# Patient Record
Sex: MALE | Race: WHITE | NOT HISPANIC OR LATINO | Employment: OTHER | URBAN - METROPOLITAN AREA
[De-identification: names, ages, dates, MRNs, and addresses within clinical notes are randomized per-mention and may not be internally consistent; named-entity substitution may affect disease eponyms.]

---

## 2017-04-26 ENCOUNTER — ALLSCRIPTS OFFICE VISIT (OUTPATIENT)
Dept: OTHER | Facility: OTHER | Age: 72
End: 2017-04-26

## 2017-04-26 DIAGNOSIS — M54.2 CERVICALGIA: ICD-10-CM

## 2017-04-26 DIAGNOSIS — E04.1 NONTOXIC SINGLE THYROID NODULE: ICD-10-CM

## 2017-04-26 DIAGNOSIS — R68.84 JAW PAIN: ICD-10-CM

## 2017-04-26 DIAGNOSIS — I10 ESSENTIAL (PRIMARY) HYPERTENSION: ICD-10-CM

## 2017-04-27 ENCOUNTER — APPOINTMENT (OUTPATIENT)
Dept: LAB | Age: 72
End: 2017-04-27
Payer: MEDICARE

## 2017-04-27 ENCOUNTER — TRANSCRIBE ORDERS (OUTPATIENT)
Dept: ADMINISTRATIVE | Age: 72
End: 2017-04-27

## 2017-04-27 ENCOUNTER — GENERIC CONVERSION - ENCOUNTER (OUTPATIENT)
Dept: OTHER | Facility: OTHER | Age: 72
End: 2017-04-27

## 2017-04-27 DIAGNOSIS — I10 ESSENTIAL (PRIMARY) HYPERTENSION: ICD-10-CM

## 2017-04-27 LAB
BUN SERPL-MCNC: 27 MG/DL (ref 5–25)
CREAT SERPL-MCNC: 1.28 MG/DL (ref 0.6–1.3)
GFR SERPL CREATININE-BSD FRML MDRD: 55.4 ML/MIN/1.73SQ M

## 2017-04-27 PROCEDURE — 84520 ASSAY OF UREA NITROGEN: CPT

## 2017-04-27 PROCEDURE — 36415 COLL VENOUS BLD VENIPUNCTURE: CPT

## 2017-04-27 PROCEDURE — 82565 ASSAY OF CREATININE: CPT

## 2017-05-04 ENCOUNTER — HOSPITAL ENCOUNTER (OUTPATIENT)
Dept: RADIOLOGY | Age: 72
Discharge: HOME | End: 2017-05-04
Payer: MEDICARE

## 2017-05-04 DIAGNOSIS — R68.84 JAW PAIN: ICD-10-CM

## 2017-05-04 DIAGNOSIS — M54.2 CERVICALGIA: ICD-10-CM

## 2017-05-04 DIAGNOSIS — E04.1 NONTOXIC SINGLE THYROID NODULE: ICD-10-CM

## 2017-05-04 PROCEDURE — 76536 US EXAM OF HEAD AND NECK: CPT

## 2017-05-04 PROCEDURE — 70492 CT SFT TSUE NCK W/O & W/DYE: CPT

## 2017-05-04 RX ADMIN — IOHEXOL 100 ML: 350 INJECTION, SOLUTION INTRAVENOUS at 10:01

## 2017-05-05 ENCOUNTER — GENERIC CONVERSION - ENCOUNTER (OUTPATIENT)
Dept: OTHER | Facility: OTHER | Age: 72
End: 2017-05-05

## 2017-05-15 ENCOUNTER — TRANSCRIBE ORDERS (OUTPATIENT)
Dept: ADMINISTRATIVE | Age: 72
End: 2017-05-15

## 2017-05-15 ENCOUNTER — APPOINTMENT (OUTPATIENT)
Dept: LAB | Age: 72
End: 2017-05-15
Payer: MEDICARE

## 2017-05-15 ENCOUNTER — GENERIC CONVERSION - ENCOUNTER (OUTPATIENT)
Dept: OTHER | Facility: OTHER | Age: 72
End: 2017-05-15

## 2017-05-15 DIAGNOSIS — E04.1 NONTOXIC UNINODULAR GOITER: ICD-10-CM

## 2017-05-15 DIAGNOSIS — E04.1 NONTOXIC UNINODULAR GOITER: Primary | ICD-10-CM

## 2017-05-15 LAB
T4 SERPL-MCNC: 9.1 UG/DL (ref 4.7–13.3)
TSH SERPL DL<=0.05 MIU/L-ACNC: 2.18 UIU/ML (ref 0.36–3.74)

## 2017-05-15 PROCEDURE — 84436 ASSAY OF TOTAL THYROXINE: CPT

## 2017-05-15 PROCEDURE — 84443 ASSAY THYROID STIM HORMONE: CPT

## 2017-05-15 PROCEDURE — 36415 COLL VENOUS BLD VENIPUNCTURE: CPT

## 2017-05-24 ENCOUNTER — HOSPITAL ENCOUNTER (OUTPATIENT)
Dept: RADIOLOGY | Facility: HOSPITAL | Age: 72
Discharge: HOME | End: 2017-05-24
Attending: SURGERY | Admitting: RADIOLOGY
Payer: MEDICARE

## 2017-05-24 DIAGNOSIS — E04.1 NONTOXIC UNINODULAR GOITER: ICD-10-CM

## 2017-05-24 PROCEDURE — 88173 CYTOPATH EVAL FNA REPORT: CPT | Performed by: SURGERY

## 2017-05-24 PROCEDURE — 76942 ECHO GUIDE FOR BIOPSY: CPT

## 2017-05-24 PROCEDURE — 10022 HB FNA W/IMAGE: CPT

## 2017-05-24 PROCEDURE — 88172 CYTP DX EVAL FNA 1ST EA SITE: CPT | Performed by: SURGERY

## 2017-05-24 RX ORDER — LIDOCAINE HYDROCHLORIDE 10 MG/ML
2.5 INJECTION, SOLUTION INFILTRATION; PERINEURAL ONCE
Status: COMPLETED | OUTPATIENT
Start: 2017-05-24 | End: 2017-05-24

## 2017-05-24 RX ADMIN — LIDOCAINE HYDROCHLORIDE 2.5 ML: 10 INJECTION, SOLUTION INFILTRATION; PERINEURAL at 10:50

## 2017-06-12 ENCOUNTER — APPOINTMENT (OUTPATIENT)
Dept: RADIOLOGY | Age: 72
End: 2017-06-12
Payer: MEDICARE

## 2017-06-12 ENCOUNTER — TRANSCRIBE ORDERS (OUTPATIENT)
Dept: ADMINISTRATIVE | Age: 72
End: 2017-06-12

## 2017-06-12 DIAGNOSIS — M25.562 PAIN IN LEFT KNEE: ICD-10-CM

## 2017-06-12 DIAGNOSIS — G89.29 CHRONIC PAIN OF LEFT KNEE: Primary | ICD-10-CM

## 2017-06-12 DIAGNOSIS — M25.562 CHRONIC PAIN OF LEFT KNEE: Primary | ICD-10-CM

## 2017-06-12 PROCEDURE — 73562 X-RAY EXAM OF KNEE 3: CPT

## 2017-06-15 ENCOUNTER — GENERIC CONVERSION - ENCOUNTER (OUTPATIENT)
Dept: OTHER | Facility: OTHER | Age: 72
End: 2017-06-15

## 2017-06-21 ENCOUNTER — GENERIC CONVERSION - ENCOUNTER (OUTPATIENT)
Dept: OTHER | Facility: OTHER | Age: 72
End: 2017-06-21

## 2017-06-21 ENCOUNTER — HOSPITAL ENCOUNTER (OUTPATIENT)
Dept: RADIOLOGY | Age: 72
Discharge: HOME | End: 2017-06-21
Payer: MEDICARE

## 2017-06-21 DIAGNOSIS — G89.29 CHRONIC PAIN OF LEFT KNEE: ICD-10-CM

## 2017-06-21 DIAGNOSIS — M25.562 CHRONIC PAIN OF LEFT KNEE: ICD-10-CM

## 2017-06-21 PROCEDURE — 73721 MRI JNT OF LWR EXTRE W/O DYE: CPT

## 2017-07-06 ENCOUNTER — ALLSCRIPTS OFFICE VISIT (OUTPATIENT)
Dept: OTHER | Facility: OTHER | Age: 72
End: 2017-07-06

## 2017-08-03 ENCOUNTER — ALLSCRIPTS OFFICE VISIT (OUTPATIENT)
Dept: OTHER | Facility: OTHER | Age: 72
End: 2017-08-03

## 2017-08-26 DIAGNOSIS — Z12.5 ENCOUNTER FOR SCREENING FOR MALIGNANT NEOPLASM OF PROSTATE: ICD-10-CM

## 2017-08-26 DIAGNOSIS — S83.242A OTHER TEAR OF MEDIAL MENISCUS, CURRENT INJURY, LEFT KNEE, INITIAL ENCOUNTER: ICD-10-CM

## 2017-08-26 DIAGNOSIS — E78.00 PURE HYPERCHOLESTEROLEMIA: ICD-10-CM

## 2017-08-26 DIAGNOSIS — Z00.00 ENCOUNTER FOR GENERAL ADULT MEDICAL EXAMINATION WITHOUT ABNORMAL FINDINGS: ICD-10-CM

## 2017-09-07 ENCOUNTER — GENERIC CONVERSION - ENCOUNTER (OUTPATIENT)
Dept: OTHER | Facility: OTHER | Age: 72
End: 2017-09-07

## 2017-09-08 ENCOUNTER — GENERIC CONVERSION - ENCOUNTER (OUTPATIENT)
Dept: OTHER | Facility: OTHER | Age: 72
End: 2017-09-08

## 2017-09-08 ENCOUNTER — APPOINTMENT (OUTPATIENT)
Dept: LAB | Age: 72
End: 2017-09-08
Payer: MEDICARE

## 2017-09-08 ENCOUNTER — ALLSCRIPTS OFFICE VISIT (OUTPATIENT)
Dept: OTHER | Facility: OTHER | Age: 72
End: 2017-09-08

## 2017-09-08 ENCOUNTER — TRANSCRIBE ORDERS (OUTPATIENT)
Dept: ADMINISTRATIVE | Age: 72
End: 2017-09-08

## 2017-09-08 DIAGNOSIS — E78.00 PURE HYPERCHOLESTEROLEMIA: ICD-10-CM

## 2017-09-08 DIAGNOSIS — Z12.5 ENCOUNTER FOR SCREENING FOR MALIGNANT NEOPLASM OF PROSTATE: ICD-10-CM

## 2017-09-08 DIAGNOSIS — S83.242A OTHER TEAR OF MEDIAL MENISCUS, CURRENT INJURY, LEFT KNEE, INITIAL ENCOUNTER: ICD-10-CM

## 2017-09-08 DIAGNOSIS — Z00.00 ENCOUNTER FOR GENERAL ADULT MEDICAL EXAMINATION WITHOUT ABNORMAL FINDINGS: ICD-10-CM

## 2017-09-08 LAB
ALBUMIN SERPL BCP-MCNC: 4 G/DL (ref 3.5–5)
ALP SERPL-CCNC: 58 U/L (ref 46–116)
ALT SERPL W P-5'-P-CCNC: 31 U/L (ref 12–78)
ANION GAP SERPL CALCULATED.3IONS-SCNC: 6 MMOL/L (ref 4–13)
AST SERPL W P-5'-P-CCNC: 24 U/L (ref 5–45)
BASOPHILS # BLD AUTO: 0.05 THOUSANDS/ΜL (ref 0–0.1)
BASOPHILS NFR BLD AUTO: 1 % (ref 0–1)
BILIRUB SERPL-MCNC: 0.6 MG/DL (ref 0.2–1)
BUN SERPL-MCNC: 22 MG/DL (ref 5–25)
CALCIUM SERPL-MCNC: 8.8 MG/DL (ref 8.3–10.1)
CHLORIDE SERPL-SCNC: 101 MMOL/L (ref 100–108)
CHOLEST SERPL-MCNC: 236 MG/DL (ref 50–200)
CO2 SERPL-SCNC: 29 MMOL/L (ref 21–32)
CREAT SERPL-MCNC: 1.21 MG/DL (ref 0.6–1.3)
EOSINOPHIL # BLD AUTO: 0.08 THOUSAND/ΜL (ref 0–0.61)
EOSINOPHIL NFR BLD AUTO: 1 % (ref 0–6)
ERYTHROCYTE [DISTWIDTH] IN BLOOD BY AUTOMATED COUNT: 13.4 % (ref 11.6–15.1)
GFR SERPL CREATININE-BSD FRML MDRD: 60 ML/MIN/1.73SQ M
GLUCOSE P FAST SERPL-MCNC: 83 MG/DL (ref 65–99)
HCT VFR BLD AUTO: 39.4 % (ref 36.5–49.3)
HCV AB SER QL: NORMAL
HDLC SERPL-MCNC: 57 MG/DL (ref 40–60)
HGB BLD-MCNC: 13.6 G/DL (ref 12–17)
LDLC SERPL CALC-MCNC: 163 MG/DL (ref 0–100)
LYMPHOCYTES # BLD AUTO: 1.91 THOUSANDS/ΜL (ref 0.6–4.47)
LYMPHOCYTES NFR BLD AUTO: 30 % (ref 14–44)
MCH RBC QN AUTO: 31.6 PG (ref 26.8–34.3)
MCHC RBC AUTO-ENTMCNC: 34.5 G/DL (ref 31.4–37.4)
MCV RBC AUTO: 91 FL (ref 82–98)
MONOCYTES # BLD AUTO: 0.48 THOUSAND/ΜL (ref 0.17–1.22)
MONOCYTES NFR BLD AUTO: 8 % (ref 4–12)
NEUTROPHILS # BLD AUTO: 3.85 THOUSANDS/ΜL (ref 1.85–7.62)
NEUTS SEG NFR BLD AUTO: 60 % (ref 43–75)
NRBC BLD AUTO-RTO: 0 /100 WBCS
PLATELET # BLD AUTO: 304 THOUSANDS/UL (ref 149–390)
PMV BLD AUTO: 10.3 FL (ref 8.9–12.7)
POTASSIUM SERPL-SCNC: 4.2 MMOL/L (ref 3.5–5.3)
PROT SERPL-MCNC: 7.9 G/DL (ref 6.4–8.2)
PSA SERPL-MCNC: 3.2 NG/ML (ref 0–4)
RBC # BLD AUTO: 4.31 MILLION/UL (ref 3.88–5.62)
SODIUM SERPL-SCNC: 136 MMOL/L (ref 136–145)
TRIGL SERPL-MCNC: 78 MG/DL
TSH SERPL DL<=0.05 MIU/L-ACNC: 2.86 UIU/ML (ref 0.36–3.74)
WBC # BLD AUTO: 6.38 THOUSAND/UL (ref 4.31–10.16)

## 2017-09-08 PROCEDURE — 84443 ASSAY THYROID STIM HORMONE: CPT

## 2017-09-08 PROCEDURE — 86803 HEPATITIS C AB TEST: CPT

## 2017-09-08 PROCEDURE — 36415 COLL VENOUS BLD VENIPUNCTURE: CPT

## 2017-09-08 PROCEDURE — 80053 COMPREHEN METABOLIC PANEL: CPT

## 2017-09-08 PROCEDURE — 85025 COMPLETE CBC W/AUTO DIFF WBC: CPT

## 2017-09-08 PROCEDURE — 80061 LIPID PANEL: CPT

## 2017-09-08 PROCEDURE — G0103 PSA SCREENING: HCPCS

## 2017-09-14 RX ORDER — RAMIPRIL 2.5 MG/1
2.5 CAPSULE ORAL
COMMUNITY
End: 2018-04-16 | Stop reason: SDUPTHER

## 2017-09-18 ENCOUNTER — ANESTHESIA EVENT (OUTPATIENT)
Dept: PERIOP | Facility: HOSPITAL | Age: 72
End: 2017-09-18
Payer: MEDICARE

## 2017-09-19 ENCOUNTER — ANESTHESIA (OUTPATIENT)
Dept: PERIOP | Facility: HOSPITAL | Age: 72
End: 2017-09-19
Payer: MEDICARE

## 2017-09-19 ENCOUNTER — HOSPITAL ENCOUNTER (OUTPATIENT)
Facility: HOSPITAL | Age: 72
Setting detail: OUTPATIENT SURGERY
Discharge: HOME | End: 2017-09-19
Attending: ORTHOPAEDIC SURGERY | Admitting: ORTHOPAEDIC SURGERY
Payer: MEDICARE

## 2017-09-19 VITALS
WEIGHT: 209 LBS | SYSTOLIC BLOOD PRESSURE: 148 MMHG | TEMPERATURE: 96.3 F | HEIGHT: 73 IN | OXYGEN SATURATION: 95 % | RESPIRATION RATE: 16 BRPM | DIASTOLIC BLOOD PRESSURE: 74 MMHG | HEART RATE: 62 BPM | BODY MASS INDEX: 27.7 KG/M2

## 2017-09-19 PROBLEM — S83.242A OTHER TEAR OF MEDIAL MENISCUS, CURRENT INJURY, LEFT KNEE, INITIAL ENCOUNTER: Status: ACTIVE | Noted: 2017-09-19

## 2017-09-19 RX ORDER — FENTANYL CITRATE/PF 50 MCG/ML
25 SYRINGE (ML) INJECTION
Status: DISCONTINUED | OUTPATIENT
Start: 2017-09-19 | End: 2017-09-19 | Stop reason: HOSPADM

## 2017-09-19 RX ORDER — ONDANSETRON 2 MG/ML
4 INJECTION INTRAMUSCULAR; INTRAVENOUS EVERY 6 HOURS PRN
Status: DISCONTINUED | OUTPATIENT
Start: 2017-09-19 | End: 2017-09-19 | Stop reason: HOSPADM

## 2017-09-19 RX ORDER — METOCLOPRAMIDE HYDROCHLORIDE 5 MG/ML
10 INJECTION INTRAMUSCULAR; INTRAVENOUS ONCE AS NEEDED
Status: DISCONTINUED | OUTPATIENT
Start: 2017-09-19 | End: 2017-09-19 | Stop reason: HOSPADM

## 2017-09-19 RX ORDER — FENTANYL CITRATE 50 UG/ML
INJECTION, SOLUTION INTRAMUSCULAR; INTRAVENOUS AS NEEDED
Status: DISCONTINUED | OUTPATIENT
Start: 2017-09-19 | End: 2017-09-19 | Stop reason: SURG

## 2017-09-19 RX ORDER — BUPIVACAINE HYDROCHLORIDE 2.5 MG/ML
INJECTION, SOLUTION EPIDURAL; INFILTRATION; INTRACAUDAL AS NEEDED
Status: DISCONTINUED | OUTPATIENT
Start: 2017-09-19 | End: 2017-09-19 | Stop reason: HOSPADM

## 2017-09-19 RX ORDER — PROPOFOL 10 MG/ML
INJECTION, EMULSION INTRAVENOUS AS NEEDED
Status: DISCONTINUED | OUTPATIENT
Start: 2017-09-19 | End: 2017-09-19 | Stop reason: SURG

## 2017-09-19 RX ORDER — OXYCODONE HYDROCHLORIDE 5 MG/1
TABLET ORAL
Qty: 15 TABLET | Refills: 0 | Status: SHIPPED | OUTPATIENT
Start: 2017-09-19 | End: 2018-02-13 | Stop reason: HOSPADM

## 2017-09-19 RX ORDER — OXYCODONE HYDROCHLORIDE 5 MG/1
5 TABLET ORAL EVERY 4 HOURS PRN
Status: DISCONTINUED | OUTPATIENT
Start: 2017-09-19 | End: 2017-09-19 | Stop reason: HOSPADM

## 2017-09-19 RX ORDER — SODIUM CHLORIDE, SODIUM LACTATE, POTASSIUM CHLORIDE, CALCIUM CHLORIDE 600; 310; 30; 20 MG/100ML; MG/100ML; MG/100ML; MG/100ML
125 INJECTION, SOLUTION INTRAVENOUS CONTINUOUS
Status: DISCONTINUED | OUTPATIENT
Start: 2017-09-19 | End: 2017-09-19 | Stop reason: HOSPADM

## 2017-09-19 RX ORDER — ACETAMINOPHEN 325 MG/1
650 TABLET ORAL EVERY 6 HOURS PRN
Status: DISCONTINUED | OUTPATIENT
Start: 2017-09-19 | End: 2017-09-19 | Stop reason: HOSPADM

## 2017-09-19 RX ORDER — MIDAZOLAM HYDROCHLORIDE 1 MG/ML
INJECTION INTRAMUSCULAR; INTRAVENOUS AS NEEDED
Status: DISCONTINUED | OUTPATIENT
Start: 2017-09-19 | End: 2017-09-19 | Stop reason: SURG

## 2017-09-19 RX ORDER — ONDANSETRON 2 MG/ML
4 INJECTION INTRAMUSCULAR; INTRAVENOUS ONCE AS NEEDED
Status: DISCONTINUED | OUTPATIENT
Start: 2017-09-19 | End: 2017-09-19 | Stop reason: HOSPADM

## 2017-09-19 RX ORDER — LIDOCAINE HYDROCHLORIDE 10 MG/ML
INJECTION, SOLUTION INFILTRATION; PERINEURAL AS NEEDED
Status: DISCONTINUED | OUTPATIENT
Start: 2017-09-19 | End: 2017-09-19 | Stop reason: SURG

## 2017-09-19 RX ORDER — OXYCODONE HYDROCHLORIDE 10 MG/1
10 TABLET ORAL EVERY 4 HOURS PRN
Status: DISCONTINUED | OUTPATIENT
Start: 2017-09-19 | End: 2017-09-19 | Stop reason: HOSPADM

## 2017-09-19 RX ORDER — ONDANSETRON 2 MG/ML
INJECTION INTRAMUSCULAR; INTRAVENOUS AS NEEDED
Status: DISCONTINUED | OUTPATIENT
Start: 2017-09-19 | End: 2017-09-19 | Stop reason: SURG

## 2017-09-19 RX ORDER — DEXAMETHASONE SODIUM PHOSPHATE 4 MG/ML
INJECTION, SOLUTION INTRA-ARTICULAR; INTRALESIONAL; INTRAMUSCULAR; INTRAVENOUS; SOFT TISSUE AS NEEDED
Status: DISCONTINUED | OUTPATIENT
Start: 2017-09-19 | End: 2017-09-19 | Stop reason: SURG

## 2017-09-19 RX ORDER — KETOROLAC TROMETHAMINE 30 MG/ML
INJECTION, SOLUTION INTRAMUSCULAR; INTRAVENOUS AS NEEDED
Status: DISCONTINUED | OUTPATIENT
Start: 2017-09-19 | End: 2017-09-19 | Stop reason: SURG

## 2017-09-19 RX ADMIN — PROPOFOL 160 MG: 10 INJECTION, EMULSION INTRAVENOUS at 09:10

## 2017-09-19 RX ADMIN — FENTANYL CITRATE 25 MCG: 50 INJECTION INTRAMUSCULAR; INTRAVENOUS at 10:16

## 2017-09-19 RX ADMIN — FENTANYL CITRATE 25 MCG: 50 INJECTION INTRAMUSCULAR; INTRAVENOUS at 10:13

## 2017-09-19 RX ADMIN — SODIUM CHLORIDE, SODIUM LACTATE, POTASSIUM CHLORIDE, AND CALCIUM CHLORIDE 125 ML/HR: .6; .31; .03; .02 INJECTION, SOLUTION INTRAVENOUS at 07:21

## 2017-09-19 RX ADMIN — ONDANSETRON 4 MG: 2 INJECTION INTRAMUSCULAR; INTRAVENOUS at 09:18

## 2017-09-19 RX ADMIN — HYDROMORPHONE HYDROCHLORIDE 0.25 MG: 1 INJECTION, SOLUTION INTRAMUSCULAR; INTRAVENOUS; SUBCUTANEOUS at 10:49

## 2017-09-19 RX ADMIN — FENTANYL CITRATE 25 MCG: 50 INJECTION, SOLUTION INTRAMUSCULAR; INTRAVENOUS at 09:18

## 2017-09-19 RX ADMIN — DEXAMETHASONE SODIUM PHOSPHATE 4 MG: 4 INJECTION, SOLUTION INTRAMUSCULAR; INTRAVENOUS at 09:18

## 2017-09-19 RX ADMIN — MIDAZOLAM HYDROCHLORIDE 1 MG: 1 INJECTION, SOLUTION INTRAMUSCULAR; INTRAVENOUS at 09:04

## 2017-09-19 RX ADMIN — CEFAZOLIN SODIUM 2000 MG: 2 SOLUTION INTRAVENOUS at 09:04

## 2017-09-19 RX ADMIN — KETOROLAC TROMETHAMINE 15 MG: 30 INJECTION, SOLUTION INTRAMUSCULAR at 09:35

## 2017-09-19 RX ADMIN — LIDOCAINE HYDROCHLORIDE 50 MG: 10 INJECTION, SOLUTION INFILTRATION; PERINEURAL at 09:10

## 2017-09-19 RX ADMIN — FENTANYL CITRATE 25 MCG: 50 INJECTION, SOLUTION INTRAMUSCULAR; INTRAVENOUS at 09:28

## 2017-09-19 RX ADMIN — HYDROMORPHONE HYDROCHLORIDE 0.25 MG: 1 INJECTION, SOLUTION INTRAMUSCULAR; INTRAVENOUS; SUBCUTANEOUS at 10:32

## 2017-09-19 RX ADMIN — PROPOFOL 40 MG: 10 INJECTION, EMULSION INTRAVENOUS at 09:23

## 2017-09-19 RX ADMIN — FENTANYL CITRATE 25 MCG: 50 INJECTION, SOLUTION INTRAMUSCULAR; INTRAVENOUS at 09:21

## 2017-09-21 ENCOUNTER — ALLSCRIPTS OFFICE VISIT (OUTPATIENT)
Dept: OTHER | Facility: OTHER | Age: 72
End: 2017-09-21

## 2017-10-26 ENCOUNTER — ALLSCRIPTS OFFICE VISIT (OUTPATIENT)
Dept: OTHER | Facility: OTHER | Age: 72
End: 2017-10-26

## 2017-10-27 NOTE — PROGRESS NOTES
Assessment  1  Arthritis of knee, left (609 19) (M17 12)    Plan  Arthritis of knee, left    · Follow-up PRN Evaluation and Treatment  Follow-up  Status: Complete  Done:  83TWQ9491 11:33AM    Discussion/Summary    Discussed history, subjective symptoms and radiographic findings with patient  Likely he is experienceing some inflammation and swelling due to underlying osteoarthritis  He was given the option of having an arthrocentesis and injection done in the office today, which he accepted  30 ml of clear fluid was removed from his knee and a corticosteroid injection was then provided  He tolerated the procedure well with no complications  He can continue to increase his activity as tolerated and see us in the future if his symptoms do not improve or worsen  The patient was counseled regarding diagnostic results,-- instructions for management,-- risk factor reductions,-- prognosis,-- patient and family education,-- impressions,-- risks and benefits of treatment options,-- importance of compliance with treatment  Possible side effects of new medications were reviewed with the patient/guardian today  The treatment plan was reviewed with the patient/guardian  The patient/guardian understands and agrees with the treatment plan         Self Referrals: No      Chief Complaint  1  Knee Pain  Left knee follow up [facility] 9/19/17      History of Present Illness  Knee Problem: The patient is being seen for left a knee problem  Symptoms:  pain,-- swelling-- and-- decreased range of motion  The patient is currently experiencing symptoms  Exacerbating factors:  Stairs  Relieving factors:  rest  No associated symptoms are reported  HPI: 69 yo M post op arthroscopic partial left medial meniscectomy 9/19/17 comes for follow up and has concerns about some pain and swelling of his left knee  He has noticed that he is having some anterior knee pain when he goes up and down stairs   He feels there is also a restriction in his knee, like there may be some fluid in his knee that is preventing him from fully extending his knee  Knee Pain: Korin Brown presents with complaints of knee pain  Associated symptoms include swelling  Review of Systems    Constitutional: No fever or chills, feels well, no tiredness, no recent weight loss or weight gain  Eyes: No complaints of red eyes, no eyesight problems  ENT: no complaints of loss of hearing, no nosebleeds, no sore throat  Cardiovascular: No complaints of chest pain, no palpitations, no leg claudication or lower extremity edema  Respiratory: No complaints of shortness of breath, no wheezing, no cough  Gastrointestinal: No complaints of abdominal pain, no constipation, no nausea or vomiting, no diarrhea or bloody stools  Genitourinary: No complaints of dysuria or incontinence, no hesitancy, no nocturia  Musculoskeletal: as noted in HPI  Integumentary: No complaints of skin rash or lesion, no itching or dry skin, no skin wounds  Neurological: No complaints of headache, no confusion, no numbness or tingling, no dizziness  Psychiatric: No suicidal thoughts, no anxiety, no depression  Endocrine: No muscle weakness, no frequent urination, no excessive thirst, no feelings of weakness  ROS reviewed  Active Problems  1  Acute medial meniscal tear, left, subsequent encounter (V58 89,836 0) (S83 242D)   2  Acute pain of left knee (719 46) (M25 562)   3  Advance directive discussed with patient (V65 49) (Z71 89)   4  Back pain (724 5) (M54 9)   5  Depression screen (V79 0) (Z13 89)   6  Encounter for prostate cancer screening (V76 44) (Z12 5)   7  Essential hypertension (401 9) (I10)   8  Hypercholesteremia (272 0) (E78 00)   9  Jaw pain (784 92) (R68 84)   10  Neck pain (723 1) (M54 2)   11  Need for hepatitis A vaccination (V05 3) (Z23)   12  Need for influenza vaccination (V04 81) (Z23)   13   Need for vaccination with 13-polyvalent pneumococcal conjugate vaccine (V03 82) (Z23)   14  Parotiditis (527 2) (K11 20)   15  Screening for genitourinary condition (V81 6) (Z13 89)   16  Thyroid nodule (241 0) (E04 1)    Past Medical History    The active problems and past medical history were reviewed and updated today  Surgical History   · History of Knee Arthroscopy With Medial Meniscectomy    The surgical history was reviewed and updated today  Family History  Mother    · Family history of malignant neoplasm (V16 9) (Z80 9)    The family history was reviewed and updated today  Social History   · Former smoker (W64 72) (T19 507)   ·    · Retired   · Social alcohol use (Z78 9)  The social history was reviewed and updated today  The social history was reviewed and is unchanged  Current Meds   1  Ramipril 2 5 MG Oral Capsule; TAKE 1 CAPSULE EVERY DAY  Requested for:   26Apr2017; Last Rx:26Apr2017 Ordered    The medication list was reviewed and updated today  Allergies  1  No Known Drug Allergies    Vitals  Signs   Heart Rate: 56  Systolic: 540  Diastolic: 71  Height: 6 ft 0 75 in  Weight: 212 lb   BMI Calculated: 28 16  BSA Calculated: 2 2  Pain Scale: 5    Physical Exam    Left Knee: Appearance: an effusion grade 1, but-- no deformity  Tenderness: lateral joint line-- and-- diffuse anterior knee  ROM: Full  Motor: Normal  Special Test: negative patellar grind,-- negative patellofemoral apprehension test,-- negative lateral Apley's Grind test,-- negative medial Apley's Grind test,-- negative Bounce Home test,-- negative medial Rebeca test,-- negative lateral Rebeca test,-- negative Anterior Drawer sign-- and-- negative Lachman test    Constitutional - General appearance: Normal    Musculoskeletal - Gait and station: Normal -- Lower extremity compartments: Normal    Cardiovascular - Pulses: Normal    Abdomen - Abdomen - Soft, nontender, nondistended  Abdomen: The abdomen was rounded     Skin - Skin and subcutaneous tissue: Normal  Neurologic - Sensation: Normal -- Lower extremity peripheral neuro exam: Normal    Psychiatric - Orientation to person, place, and time: Normal -- Mood and affect: Normal    Eyes   Conjunctiva and lids: Normal     Pupils and irises: Normal        Results/Data  I personally reviewed the films/images/results in the office today  My interpretation follows  X-ray Review Previous radiographs reviewed  There is mild amount of medial compartment joint degenerative changes  No acute osseus abnormalities  Procedure    Procedure: Aspiration Injection of the left knee joint  Indication:  Effusion,-- Joint pain-- and-- Osteoarthritis  Potential complications include bleeding,-- infection-- and-- allergic reaction  Risk, benefits and alternatives were discussed with the patient  Verbal consent was obtained prior to the procedure  Alcohol was used to prep the area  ethyl chloride spray was used as a topical anesthetic  Using sterile technique, the aspiration/injection needle was then directed from a Superolateral aspect  30 mL of clear fluid was aspirated with an 18-gauge 1 5 inch needle  The syringe was changed and the same needle was left in place and was used to inject 4 mL of 0 25% Bupivacaine-- and-- 1 mL of 6mg/mL betamethasone  A bandage was applied  the patient tolerated the procedure well  Complications: none  Attending Note  Attending Note [facility]: Attending Note: I interviewed, took the history and examined the patient,-- the staff discussed the patient on the day of the visit,-- I supervised the Resident,-- I supervised the procedure performed by the Resident-- and-- I agree with the Resident management plan as it was presented to me  Level of Participation: I was present in clinic and examined the patient  Diagnosis and Plan: the patient has an effusion of his left knee secondary to osteoarthritis and an aspiration and injection was provided today   This is separate and distinct from his meniscal pathology, though symptoms have been improved by the surgery    If he continues to be symptomatic Visco supplementation could be considered and finally total knee arthroplasty would be the surgical treatment for osteoarthritis of the knee  I agree with the Resident's note  Signatures   Electronically signed by :  Molly Cordon DO; Oct 26 2017 10:17AM EST                       (Author)    Electronically signed by : SIXTO Mckee ; Oct 26 2017 11:34AM EST                       (Author)

## 2017-11-06 ENCOUNTER — TRANSCRIBE ORDERS (OUTPATIENT)
Dept: ADMINISTRATIVE | Facility: HOSPITAL | Age: 72
End: 2017-11-06

## 2017-11-06 ENCOUNTER — GENERIC CONVERSION - ENCOUNTER (OUTPATIENT)
Dept: OTHER | Facility: OTHER | Age: 72
End: 2017-11-06

## 2017-11-06 DIAGNOSIS — E04.1 NONTOXIC UNINODULAR GOITER: Primary | ICD-10-CM

## 2017-11-13 ENCOUNTER — TRANSCRIBE ORDERS (OUTPATIENT)
Dept: RADIOLOGY | Facility: HOSPITAL | Age: 72
End: 2017-11-13

## 2017-11-13 ENCOUNTER — HOSPITAL ENCOUNTER (OUTPATIENT)
Dept: RADIOLOGY | Facility: HOSPITAL | Age: 72
Discharge: HOME | End: 2017-11-13
Attending: SURGERY
Payer: MEDICARE

## 2017-11-13 DIAGNOSIS — E04.1 NONTOXIC UNINODULAR GOITER: ICD-10-CM

## 2017-11-13 PROCEDURE — 76942 ECHO GUIDE FOR BIOPSY: CPT

## 2017-11-13 PROCEDURE — 88173 CYTOPATH EVAL FNA REPORT: CPT | Performed by: SURGERY

## 2017-11-13 PROCEDURE — 88172 CYTP DX EVAL FNA 1ST EA SITE: CPT | Performed by: SURGERY

## 2017-11-13 PROCEDURE — 10022 HB FNA W/IMAGE: CPT

## 2017-11-13 RX ORDER — LIDOCAINE HYDROCHLORIDE 10 MG/ML
5 INJECTION, SOLUTION INFILTRATION; PERINEURAL ONCE
Status: COMPLETED | OUTPATIENT
Start: 2017-11-13 | End: 2017-11-13

## 2017-11-13 RX ADMIN — LIDOCAINE HYDROCHLORIDE 5 ML: 10 INJECTION, SOLUTION INFILTRATION; PERINEURAL at 11:10

## 2017-11-30 ENCOUNTER — APPOINTMENT (OUTPATIENT)
Dept: RADIOLOGY | Facility: OTHER | Age: 72
End: 2017-11-30
Payer: MEDICARE

## 2017-11-30 ENCOUNTER — ALLSCRIPTS OFFICE VISIT (OUTPATIENT)
Dept: OTHER | Facility: OTHER | Age: 72
End: 2017-11-30

## 2017-11-30 DIAGNOSIS — M25.511 PAIN IN RIGHT SHOULDER: ICD-10-CM

## 2017-11-30 DIAGNOSIS — M75.41 IMPINGEMENT SYNDROME OF RIGHT SHOULDER: ICD-10-CM

## 2017-11-30 PROCEDURE — 73030 X-RAY EXAM OF SHOULDER: CPT

## 2017-12-05 NOTE — PROGRESS NOTES
Assessment    1  Shoulder pain, right (719 41) (M27 511)   2  Impingement syndrome of right shoulder (726 2) (M37 96)    Plan  Impingement syndrome of right shoulder    · *1 - [facility]  *  Status: Active  Requested for: 10NUQ7431  Care Summary provided  : Yes   · Follow-up visit in 2 months Evaluation and Treatment  Follow-up  Status: Hold For -  Scheduling  Requested for: 96ZTF3701   · What is a corticosteroid?; Status:Complete;   Done: 35HNW3977 03:18PM  Shoulder pain, right    · * XR SHOULDER 2+ VIEW RIGHT; Status:Active; Requested for:74Eeu9681;     Discussion/Summary    The patient has an examination and clinical history consistent with subacromial impingement syndrome of the right shoulder  Subacromial injection was provided with referral to physical therapy, follow up in 2 months  Failure to improve would indicate him for an MRI scan of the right shoulder  Chief Complaint    1  Shoulder Pain    History of Present Illness  The patient Presents with a chief complaint of right shoulder pain  He has no precipitating injury but describes the pain as sharp it is intermittent timing and is localized to the lateral aspect of his proximal humerus it does radiate to the deltoid insertion  No numbness or tingling or fevers and chills associated with this, it is worse with overhead activity with the arm rotated in certain directions relieved by activity below shoulder height  Review of Systems    Constitutional: No fever or chills, feels well, no tiredness, no recent weight loss or weight gain  Eyes: No complaints of red eyes, no eyesight problems  ENT: no complaints of loss of hearing, no nosebleeds, no sore throat  Cardiovascular: No complaints of chest pain, no palpitations, no leg claudication or lower extremity edema  Respiratory: No complaints of shortness of breath, no wheezing, no cough     Gastrointestinal: No complaints of abdominal pain, no constipation, no nausea or vomiting, no diarrhea or bloody stools  Genitourinary: No complaints of dysuria or incontinence, no hesitancy, no nocturia  Musculoskeletal: as noted in HPI  Integumentary: No complaints of skin rash or lesion, no itching or dry skin, no skin wounds  Neurological: No complaints of headache, no confusion, no numbness or tingling, no dizziness  Psychiatric: No suicidal thoughts, no anxiety, no depression  Endocrine: No muscle weakness, no frequent urination, no excessive thirst, no feelings of weakness  ROS reviewed  Active Problems    1  Active advance directive on file (V49 89) (Z78 9)   2  Acute medial meniscal tear, left, subsequent encounter (V58 89,836 0) (S83 242D)   3  Acute pain of left knee (719 46) (M25 562)   4  Arthritis of knee, left (716 96) (M17 12)   5  Back pain (724 5) (M54 9)   6  Depression screen (V79 0) (Z13 89)   7  Encounter for prostate cancer screening (V76 44) (Z12 5)   8  Essential hypertension (401 9) (I10)   9  Hypercholesteremia (272 0) (E78 00)   10  Jaw pain (784 92) (R68 84)   11  Neck pain (723 1) (M54 2)   12  Need for hepatitis A vaccination (V05 3) (Z23)   13  Need for influenza vaccination (V04 81) (Z23)   14  Need for vaccination with 13-polyvalent pneumococcal conjugate vaccine (V03 82) (Z23)   15  Parotiditis (527 2) (K11 20)   16  Screening for genitourinary condition (V81 6) (Z13 89)   17  Shoulder pain, right (719 41) (M25 511)   18  Thyroid nodule (241 0) (E04 1)    Past Medical History    The active problems and past medical history were reviewed and updated today  Surgical History    · History of Knee Arthroscopy With Medial Meniscectomy    The surgical history was reviewed and updated today  Family History  Mother    · Family history of malignant neoplasm (V16 9) (Z80 9)    The family history was reviewed and updated today         Social History    · Active advance directive on file (G92 67) (Z78 9)   · Former smoker (N88 22) (F38 600) ·    · Retired   · Social alcohol use (Z78 9)  The social history was reviewed and updated today  Current Meds   1  Ramipril 2 5 MG Oral Capsule; TAKE 1 CAPSULE EVERY DAY  Requested for:   26Apr2017; Last Rx:26Apr2017 Ordered    The medication list was reviewed and updated today  Allergies    1  No Known Drug Allergies    Vitals   Recorded: 39NSD4141 02:45PM   Heart Rate 76   Systolic 270   Diastolic 79   Height 6 ft 0 75 in   Weight 210 lb    BMI Calculated 27 9   BSA Calculated 2 19     Physical Exam    Right Shoulder: Appearance: no AC joint hypertrophy, no AC joint step-off, no deltoid atrophy, no trapezius atrophy and no belly of the biceps ryan deformity  Tenderness: coracoid and deltoid, but not the [facility] joint, not the axillary and not the bicipital groove  Palpatory findings include crepitus and no warmth  ROM: equivalent both sides  Motor: 5/5 abduction and 5/5 external rotation  Special Tests: positive Painful Arc, positive Benjamin test and positive Neer test, but negative Drop Arm test, negative Empty Can test, negative Lift Off test, negative Belly Press test, negative Bear Hug test and negative Hornblowers' test    Constitutional - General appearance: Normal    Musculoskeletal - Gait and station: Normal    Cardiovascular - Pulses: Normal    Skin - Skin and subcutaneous tissue: Normal    Neurologic - Sensation: Normal    Psychiatric - Orientation to person, place, and time: Normal       Results/Data  I personally reviewed the films/images/results in the office today  My interpretation follows  X-ray Review Three views right shoulder show no glenohumeral abnormality  Procedure  The patient was indicated for a subacromial corticosteroid injection of the right shoulder as detailed in the office note  This was performed at the same time as the office visit for the patient's convenience   A thorough discussion regarding the risks and benefits of the injection as well as alternatives to the injection was performed with the patient  Specific risks discussed include but are not limited to infection, flare reaction, continued symptoms, need for further intervention, temporary elevation of blood glucose, as well as skin color changes at the site of injection  After this discussion the patient provided verbal consent to proceed forward with the injection  The right shoulder was anesthetized with ethyl chloride spray, prepped with alcohol in routine fashion and using a lateral approach, a 22-gauge needle was used to inject a combination of 1 mL of 6mg/mL betamethasone and 2 ml of 0 25% bupivicane into the subacromial space  The patient tolerated the procedure well without complication and a Band-Aid was placed  Avoidance of strenuous activity for 24-48 hours was recommended and a detailed handout regarding the medication utilized and the possible side effects as well as activity restriction was provided to the patient  Therapy  Rehabilitation Services Referral:   Patient Status: routine  Diagnosis: Right subacromial impingement syndrome  Exercise/Treatment: AROM/PROM, stretching, shoulder, stabilization and strengthening/PRE  Frequency: 1-2 times per week, for 8 weeks  Please send progress report  I hereby certify that the services indicated above are medically necessary        Signatures   Electronically signed by : SIXTO Bautista ; Nov 30 2017  3:19PM EST                       (Author)

## 2017-12-27 ENCOUNTER — OFFICE VISIT (OUTPATIENT)
Dept: URGENT CARE | Age: 72
End: 2017-12-27
Payer: MEDICARE

## 2017-12-27 PROCEDURE — 99213 OFFICE O/P EST LOW 20 MIN: CPT | Performed by: FAMILY MEDICINE

## 2017-12-27 PROCEDURE — G0463 HOSPITAL OUTPT CLINIC VISIT: HCPCS | Performed by: FAMILY MEDICINE

## 2017-12-28 ENCOUNTER — GENERIC CONVERSION - ENCOUNTER (OUTPATIENT)
Dept: OBGYN CLINIC | Facility: OTHER | Age: 72
End: 2017-12-28

## 2017-12-28 ENCOUNTER — APPOINTMENT (OUTPATIENT)
Dept: PHYSICAL THERAPY | Facility: REHABILITATION | Age: 72
End: 2017-12-28
Payer: MEDICARE

## 2017-12-28 DIAGNOSIS — M75.41 IMPINGEMENT SYNDROME OF RIGHT SHOULDER: ICD-10-CM

## 2017-12-28 PROCEDURE — 97112 NEUROMUSCULAR REEDUCATION: CPT

## 2017-12-28 PROCEDURE — 97140 MANUAL THERAPY 1/> REGIONS: CPT

## 2017-12-28 PROCEDURE — 97161 PT EVAL LOW COMPLEX 20 MIN: CPT

## 2017-12-28 PROCEDURE — G8990 OTHER PT/OT CURRENT STATUS: HCPCS

## 2017-12-28 PROCEDURE — G8991 OTHER PT/OT GOAL STATUS: HCPCS

## 2017-12-29 NOTE — PROGRESS NOTES
Assessment   1  Acute bronchitis (466 0) (J20 9)    Plan   Acute bronchitis    · Zithromax Z-Jon 250 MG Oral Tablet (Azithromycin); TAKE 2 TABLETS ON DAY 1    THEN TAKE 1 TABLET A DAY FOR 4 DAYS    Discussion/Summary   Discussion Summary:    Take Sharmon Ree as directed daily fluids/rest as directed  up with your family physician if no improvement  to the ER if worsening  Medication Side Effects Reviewed: Possible side effects of new medications were reviewed with the patient/guardian today  Understands and agrees with treatment plan: The treatment plan was reviewed with the patient/guardian  The patient/guardian understands and agrees with the treatment plan    Follow Up Instructions: Follow Up with your Primary Care Provider in 5 days  If your symptoms worsen, go to the nearest [facility] Emergency Department  Chief Complaint   1  Cough  Chief Complaint Free Text Note Form: c/o of cough for the last 4 days that has been keeping him awake at HS      History of Present Illness   HPI: 66 y/o male c/o productive cough x 4 days-green/yellow mucus  Denies use of OTC medications  Former smoker  Denies wheezing or SOB  Review of Systems   Focused-Male:      Constitutional: feeling poorly, but-- no fever  ENT: no earache,-- no sore throat-- and-- no nasal discharge  Cardiovascular: no chest pain  Respiratory: cough, but-- no shortness of breath-- and-- no wheezing  Neurological: no headache  ROS Reviewed:    ROS reviewed  Active Problems   1  Active advance directive on file (V49 89) (Z78 9)   2  Acute medial meniscal tear, left, subsequent encounter (V58 89,836 0) (S83 242D)   3  Acute pain of left knee (719 46) (M25 562)   4  Arthritis of knee, left (716 96) (M17 12)   5  Back pain (724 5) (M54 9)   6  Depression screen (V79 0) (Z13 89)   7  Encounter for prostate cancer screening (V76 44) (Z12 5)   8  Essential hypertension (401 9) (I10)   9   Hypercholesteremia (272 0) (E78 00)   10  Impingement syndrome of right shoulder (726 2) (M75 41)   11  Jaw pain (784 92) (R68 84)   12  Neck pain (723 1) (M54 2)   13  Need for hepatitis A vaccination (V05 3) (Z23)   14  Need for influenza vaccination (V04 81) (Z23)   15  Need for vaccination with 13-polyvalent pneumococcal conjugate vaccine (V03 82) (Z23)   16  Parotiditis (527 2) (K11 20)   17  Screening for genitourinary condition (V81 6) (Z13 89)   18  Shoulder pain, right (719 41) (M25 511)   19  Thyroid nodule (241 0) (E04 1)    Past Medical History   Active Problems And Past Medical History Reviewed: The active problems and past medical history were reviewed and updated today  Family History   Mother    1  Family history of malignant neoplasm (V16 9) (Z80 9)  Family History Reviewed: The family history was reviewed and updated today  Social History    · Active advance directive on file (B22 93) (Z78 9)   · Former smoker (G23 32) (C02 089)   ·    · Retired   · Social alcohol use (Z78 9)  Social History Reviewed: The social history was reviewed and updated today  The social history was reviewed and is unchanged  Surgical History   1  History of Knee Arthroscopy With Medial Meniscectomy  Surgical History Reviewed: The surgical history was reviewed and updated today  Current Meds    1  Ramipril 2 5 MG Oral Capsule; TAKE 1 CAPSULE EVERY DAY  Requested for:     26Apr2017; Last Rx:26Apr2017 Ordered  Medication List Reviewed: The medication list was reviewed and updated today  Allergies   1  No Known Drug Allergies    Vitals   Signs   Recorded: 16Rof8357 07:39AM   Temperature: 98 3 F  Heart Rate: 80  Respiration: 20  Systolic: 718  Diastolic: 67  Height: 6 ft 0 75 in  Weight: 210 lb   BMI Calculated: 27 9  BSA Calculated: 2 19  O2 Saturation: 99  Pain Scale: 0    Physical Exam        Constitutional mildly ill appearing, NAD        Ears, Nose, Mouth, and Throat      External inspection of ears and nose: Normal        Otoscopic examination: Tympanic membrance translucent with normal light reflex  Canals patent without erythema  Oropharynx: Normal with no erythema, edema, exudate or lesions  Pulmonary      Respiratory effort: Abnormal   Respiratory rate: normal  Assessment of respiratory effort revealed normal rhythm and effort  Respiratory Findings: wet cough  Auscultation of lungs: Clear to auscultation  Cardiovascular      Auscultation of heart: Normal rate and rhythm, normal S1 and S2, without murmurs  Lymphatic      Palpation of lymph nodes in neck: No lymphadenopathy         Psychiatric      Orientation to person, place and time: Normal        Mood and affect: Normal        Signatures    Electronically signed by : Charity Weiner [facility]; Dec 27 2017  7:52AM EST                       (Author)     Electronically signed by : Vivek Joyner DO; Dec 28 2017  7:06AM EST                       (Co-author)

## 2018-01-02 ENCOUNTER — APPOINTMENT (OUTPATIENT)
Dept: PHYSICAL THERAPY | Facility: REHABILITATION | Age: 73
End: 2018-01-02
Payer: MEDICARE

## 2018-01-02 PROCEDURE — 97110 THERAPEUTIC EXERCISES: CPT

## 2018-01-02 PROCEDURE — 97112 NEUROMUSCULAR REEDUCATION: CPT

## 2018-01-02 PROCEDURE — 97140 MANUAL THERAPY 1/> REGIONS: CPT

## 2018-01-04 ENCOUNTER — APPOINTMENT (OUTPATIENT)
Dept: PHYSICAL THERAPY | Facility: REHABILITATION | Age: 73
End: 2018-01-04
Payer: MEDICARE

## 2018-01-04 PROCEDURE — 97112 NEUROMUSCULAR REEDUCATION: CPT

## 2018-01-04 PROCEDURE — 97110 THERAPEUTIC EXERCISES: CPT

## 2018-01-04 PROCEDURE — 97140 MANUAL THERAPY 1/> REGIONS: CPT

## 2018-01-08 ENCOUNTER — APPOINTMENT (OUTPATIENT)
Dept: PHYSICAL THERAPY | Facility: REHABILITATION | Age: 73
End: 2018-01-08
Payer: MEDICARE

## 2018-01-08 PROCEDURE — 97112 NEUROMUSCULAR REEDUCATION: CPT

## 2018-01-08 PROCEDURE — 97110 THERAPEUTIC EXERCISES: CPT

## 2018-01-08 PROCEDURE — 97140 MANUAL THERAPY 1/> REGIONS: CPT

## 2018-01-09 ENCOUNTER — APPOINTMENT (OUTPATIENT)
Dept: PHYSICAL THERAPY | Facility: REHABILITATION | Age: 73
End: 2018-01-09
Payer: MEDICARE

## 2018-01-09 PROCEDURE — 97110 THERAPEUTIC EXERCISES: CPT

## 2018-01-09 PROCEDURE — 97140 MANUAL THERAPY 1/> REGIONS: CPT

## 2018-01-09 PROCEDURE — 97112 NEUROMUSCULAR REEDUCATION: CPT

## 2018-01-09 NOTE — CONSULTS
Therapy  Rehabilitation Services Referral:   Patient Status: routine  Diagnosis: Right subacromial impingement syndrome  Exercise/Treatment: AROM/PROM, stretching, shoulder, stabilization and strengthening/PRE  Frequency: 1-2 times per week, for 8 weeks  Please send progress report  I hereby certify that the services indicated above are medically necessary        Signatures   Electronically signed by : SIXTO Delgado ; Nov 30 2017  3:19PM EST                       (Author)

## 2018-01-10 NOTE — RESULT NOTES
Verified Results  [address] [place] [address] [id number] 09:12AM Breanna Paulino Order Number: [id number]    - Patient Instructions: To schedule this appointment, please contact Central Scheduling at [phone]  Test Name Result Flag Reference   US THYROID (Report)     THYROID ULTRASOUND     INDICATION: 79-year-old male for follow-up thyroid nodule  COMPARISON: CT soft tissue neck 5/4/2017  TECHNIQUE:  Ultrasound of the thyroid was performed with a high frequency linear transducer in transverse and sagittal planes including volumetric imaging sweeps as well as traditional still imaging technique  FINDINGS:   Thyroid parenchyma is mildly heterogeneous in echotexture with focal nodule(s) as described below  Right gland: 4 5 x 1 6 x 2 0 cm  No dominant nodules  Normal vascularity throughout the lobe  Left gland: 5 0 x 1 9 x 1 6 cm  The following dominant nodule is evaluated:   1  Midpole, 1 6 x 1 1 x 1 1 cm solid hypoechoic microlobular nodule with scattered internal vascularity and echogenic foci concerning for microcalcifications, which corresponds to the CT finding  FNA is warranted  Normal vascularity throughout the lobe  Isthmus: The isthmus is 0 2 cm in AP dimension  The following dominant nodules are evaluated:   1  Right inferior isthmus, an 1 3 x 0 9 x 1 3 cm isoechoic ill-defined nodule with scattered echogenic foci in the the majority of which do not demonstrate ringdown artifact, concerning for microcalcifications, predominantly peripheral in location  Minimal internal vascularity  FNA recommended  2  Right isthmus, 0 8 x 0 4 x 0 7 cm isoechoic well-circumscribed nodule with near complete hypoechoic capsule  Internal vascularity with no echogenic foci  IMPRESSION:      Multinodular thyroid gland  FNA recommended for indeterminate nodules in the left mid pole and right isthmus as described above utilizing current JENNIFER guidelines       Reference: 2015 American Thyroid Association Management Guidelines for Adult Patients with Thyroid Nodules and Differentiated Thyroid Cancer  Thyroid, Volume 26, Number 1, 2016  ##sigslh##sigslh       Workstation performed: [id number]     Signed by:   Kerrie Valencia MD   5/5/17       Plan  Thyroid nodule    · 2 - [facility]  (General Surgery) Co-Management  *FNA of thryoid nodule?    Status: Hold For - Scheduling  Requested for: 83KIU1064  Care Summary provided  : Yes

## 2018-01-11 ENCOUNTER — APPOINTMENT (OUTPATIENT)
Dept: PHYSICAL THERAPY | Facility: REHABILITATION | Age: 73
End: 2018-01-11
Payer: MEDICARE

## 2018-01-11 PROCEDURE — 97140 MANUAL THERAPY 1/> REGIONS: CPT

## 2018-01-11 PROCEDURE — 97112 NEUROMUSCULAR REEDUCATION: CPT

## 2018-01-11 PROCEDURE — 97110 THERAPEUTIC EXERCISES: CPT

## 2018-01-12 VITALS
HEART RATE: 55 BPM | HEIGHT: 73 IN | SYSTOLIC BLOOD PRESSURE: 134 MMHG | BODY MASS INDEX: 27.88 KG/M2 | OXYGEN SATURATION: 97 % | DIASTOLIC BLOOD PRESSURE: 76 MMHG | WEIGHT: 210.38 LBS

## 2018-01-12 VITALS
HEART RATE: 58 BPM | DIASTOLIC BLOOD PRESSURE: 60 MMHG | HEIGHT: 73 IN | SYSTOLIC BLOOD PRESSURE: 112 MMHG | TEMPERATURE: 98.5 F | RESPIRATION RATE: 16 BRPM | BODY MASS INDEX: 27.44 KG/M2 | WEIGHT: 207.01 LBS | OXYGEN SATURATION: 98 %

## 2018-01-12 NOTE — RESULT NOTES
Verified Results  CT SOFT TISSUE NECK W WO CONTRAST [id number] 09:12AM Sarah Mckeon Order Number: [id number]    - Patient Instructions: To schedule this appointment, please contact Central Scheduling at [phone]  Test Name Result Flag Reference   CT SOFT TISSUE NECK W WO CONTRAST (Report)     CT NECK WITHOUT AND WITH CONTRAST     INDICATION: Possible stone  Left jaw pain with grinding  COMPARISON: None  TECHNIQUE: Contiguous 2 5 mm images were obtained through the neck before and after administration of intravenous contrast       Radiation dose length product (DLP) for this visit: 1626 mGy-cm   This examination, like all CT scans performed in the [facility], was performed utilizing techniques to minimize radiation dose exposure, including the use of iterative    reconstruction and automated exposure control  IV Contrast: 100 mL of iohexol (OMNIPAQUE)        IMAGE QUALITY: Diagnostic  FINDINGS:     VISUALIZED BRAIN PARENCHYMA: No acute intracranial pathology of the visualized brain parenchyma  VISUALIZED ORBITS AND PARANASAL SINUSES: Normal      NASAL CAVITY AND NASOPHARYNX: Normal      SUPRAHYOID NECK: Normal oral cavity, tongue base, tonsillar fossa and epiglottis  Prevertebral soft tissues are normal         INFRAHYOID NECK: Aryepiglottic folds, laryngeal tissues, and piriform sinuses are normal         THYROID GLAND: 1 cm nodule mid pole left thyroid lobe with a few punctate calcifications  PAROTID AND SUBMANDIBULAR GLANDS: Normal      LYMPH NODES: No pathologic or enlarged adenopathy  VASCULAR STRUCTURES: Normal enhancement of the cervical vasculature  THORACIC INLET: Lung apices and upper mediastinum are unremarkable  BONY STRUCTURES: Levoscoliosis upper thoracic spine at T3-4  Severe facet degenerative change on the left at the cervicothoracic junction  IMPRESSION:     No evidence of sialolith   Normal contrast-enhanced CT of the neck  1 cm left thyroid nodule noted         Workstation performed: [id number]     Signed by:   Jenni Pinedo DO   5/5/17       Plan  Thyroid nodule    · US THYROID; Status:Hold For - Scheduling; Requested for:[id number];

## 2018-01-13 VITALS
DIASTOLIC BLOOD PRESSURE: 79 MMHG | HEART RATE: 70 BPM | BODY MASS INDEX: 27.7 KG/M2 | WEIGHT: 209 LBS | SYSTOLIC BLOOD PRESSURE: 154 MMHG | HEIGHT: 73 IN

## 2018-01-13 VITALS
WEIGHT: 214.38 LBS | SYSTOLIC BLOOD PRESSURE: 150 MMHG | HEART RATE: 66 BPM | DIASTOLIC BLOOD PRESSURE: 82 MMHG | BODY MASS INDEX: 28.28 KG/M2

## 2018-01-13 VITALS
BODY MASS INDEX: 27.57 KG/M2 | HEART RATE: 64 BPM | WEIGHT: 208 LBS | DIASTOLIC BLOOD PRESSURE: 80 MMHG | SYSTOLIC BLOOD PRESSURE: 160 MMHG | HEIGHT: 73 IN

## 2018-01-13 VITALS
WEIGHT: 212 LBS | HEIGHT: 73 IN | DIASTOLIC BLOOD PRESSURE: 71 MMHG | BODY MASS INDEX: 28.1 KG/M2 | SYSTOLIC BLOOD PRESSURE: 149 MMHG | HEART RATE: 56 BPM

## 2018-01-14 VITALS
WEIGHT: 210 LBS | DIASTOLIC BLOOD PRESSURE: 79 MMHG | BODY MASS INDEX: 27.83 KG/M2 | SYSTOLIC BLOOD PRESSURE: 150 MMHG | HEART RATE: 76 BPM | HEIGHT: 73 IN

## 2018-01-15 ENCOUNTER — APPOINTMENT (OUTPATIENT)
Dept: PHYSICAL THERAPY | Facility: REHABILITATION | Age: 73
End: 2018-01-15
Payer: MEDICARE

## 2018-01-15 PROCEDURE — 97140 MANUAL THERAPY 1/> REGIONS: CPT

## 2018-01-15 PROCEDURE — 97110 THERAPEUTIC EXERCISES: CPT

## 2018-01-15 PROCEDURE — 97112 NEUROMUSCULAR REEDUCATION: CPT

## 2018-01-15 NOTE — RESULT NOTES
Discussion/Summary   low grade sprain of medial collateral ligament, with swelling and popliteal cyst, I would recommend icing knee intermittently and follow up with ortho for this     Verified Results  * MRI KNEE LEFT  WO CONTRAST 21Jun2017 09:13AM Leonardo Wing     Test Name Result Flag Reference   MRI KNEE LEFT  WO CONTRAST (Report)     This is a summary report  The complete report is available in the patient's medical record  If you cannot access the medical record, please contact the sending organization for a detailed fax or copy  MRI LEFT KNEE     INDICATION: M25 562: Pain in left knee   G89 29: Other chronic pain  History taken directly from the electronic ordering system  COMPARISON: Left knee radiograph 6/12/2017     TECHNIQUE:  MR sequences were obtained of the left knee including: Localizer, axial T2 fat sat, coronal T1/T2 fat sat, sagittal PD/T2 fat sat  Images were acquired on a 1 5 Venecia unit  Gadolinium was not used  FINDINGS:     SUBCUTANEOUS TISSUES: Normal     JOINT EFFUSION: There is a moderate joint effusion  BAKER'S CYST: There is a moderate Baker's cyst      MENISCI: There is increased proton density signal within the body and posterior medial meniscus contacting the inferior surface compatible with horizontal tear  No flipped or displaced fragment  Lateral meniscus is intact  CRUCIATE LIGAMENTS: Intact  EXTENSOR APPARATUS: Mild patellar tendinosis  COLLATERAL LIGAMENTS: There is mild edema about the medial collateral ligament compatible with grade 1 sprain  ARTICULAR SURFACES: There is near full-thickness cartilage loss involving the posterior aspect of the medial femoral condyle (series 3 image 18 and 19)  There is subchondral marrow edema within the medial femoral condyle and medial tibial plateau      There is cartilage thinning within the patellofemoral compartment with subchondral marrow edema within the central aspect of the trochlea (series 3 image 11)  BONE MARROW: No osseous edema to suggest fracture or contusion  There is subchondral marrow edema in the medial and patellofemoral compartments  MUSCULATURE: Intact  IMPRESSION:   1  Resolved tear of the body and posterior horn of the medial meniscus without flipped or displaced fragment  2  Low-grade sprain of the medial collateral ligament     3  Moderate joint effusion and popliteal cyst        Workstation performed: [id number]     Signed by:   Brissa Medina MD   6/21/17

## 2018-01-15 NOTE — RESULT NOTES
Discussion/Summary   labs all ok except cholesterol up, watch diet for this, and consider statin if not coming down     Verified Results  (1) CBC/PLT/DIFF [id number] 09:17AM [facility] Order Number: [id number]     Test Name Result Flag Reference   WBC COUNT 6 38 Thousand/uL  4 31-10 16   RBC COUNT 4 31 Million/uL  3 88-5 62   HEMOGLOBIN 13 6 g/dL  12 0-17 0   HEMATOCRIT 39 4 %  36 5-49 3   MCV 91 fL  82-98   MCH 31 6 pg  26 8-34 3   MCHC 34 5 g/dL  31 4-37 4   RDW 13 4 %  11 6-15 1   MPV 10 3 fL  8 9-12 7   PLATELET COUNT 939 Thousands/uL  149-390   nRBC AUTOMATED 0 /100 WBCs     NEUTROPHILS RELATIVE PERCENT 60 %  43-75   LYMPHOCYTES RELATIVE PERCENT 30 %  14-44   MONOCYTES RELATIVE PERCENT 8 %  4-12   EOSINOPHILS RELATIVE PERCENT 1 %  0-6   BASOPHILS RELATIVE PERCENT 1 %  0-1   NEUTROPHILS ABSOLUTE COUNT 3 85 Thousands/? ??L  1 85-7 62   LYMPHOCYTES ABSOLUTE COUNT 1 91 Thousands/? ??L  0 60-4 47   MONOCYTES ABSOLUTE COUNT 0 48 Thousand/? ??L  0 17-1 22   EOSINOPHILS ABSOLUTE COUNT 0 08 Thousand/? ??L  0 00-0 61   BASOPHILS ABSOLUTE COUNT 0 05 Thousands/? ??L  0 00-0 10     (1) COMPREHENSIVE METABOLIC PANEL [id number] 93:35AA [facility] Order Number: [id number]     Test Name Result Flag Reference   SODIUM 136 mmol/L  136-145   POTASSIUM 4 2 mmol/L  3 5-5 3   CHLORIDE 101 mmol/L  100-108   CARBON DIOXIDE 29 mmol/L  21-32   ANION GAP (CALC) 6 mmol/L  4-13   BLOOD UREA NITROGEN 22 mg/dL  5-25   CREATININE 1 21 mg/dL  0 60-1 30   Standardized to IDMS reference method   CALCIUM 8 8 mg/dL  8 3-10 1   BILI, TOTAL 0 60 mg/dL  0 20-1 00   ALK PHOSPHATAS 58 U/L     ALT (SGPT) 31 U/L  12-78   Specimen collection should occur prior to Sulfasalazine and/or Sulfapyridine administration due to the potential for falsely depressed results  AST(SGOT) 24 U/L  5-45   Specimen collection should occur prior to Sulfasalazine administration due to the potential for falsely depressed results  ALBUMIN 4 0 g/dL  3 5-5 0   TOTAL PROTEIN 7 9 g/dL  6 4-8 2   eGFR 60 ml/min/1 73sq m     [facility] Disease Education Program recommendations are as follows:  GFR calculation is accurate only with a steady state creatinine  Chronic Kidney disease less than 60 ml/min/1 73 sq  meters  Kidney failure less than 15 ml/min/1 73 sq  meters  GLUCOSE FASTING 83 mg/dL  65-99   Specimen collection should occur prior to Sulfasalazine administration due to the potential for falsely depressed results  Specimen collection should occur prior to Sulfapyridine administration due to the potential for falsely elevated results  (1) LIPID PANEL, FASTING [id number] 09:17AM Mark Swain Order Number: [id number]     Test Name Result Flag Reference   CHOLESTEROL 236 mg/dL H    HDL,DIRECT 57 mg/dL  40-60   Specimen collection should occur prior to Metamizole administration due to the potential for falsley depressed results  LDL CHOLESTEROL CALCULATED 163 mg/dL H 0-100   Triglyceride:        Normal ??? ??? ??? ??? ??? ??? ??? <150 mg/dl   ??? ??? ???Borderline High ??? ??? 150-199 mg/dl   ??? ??? ? ?? High ??? ??? ??? ??? ??? ??? ??? 200-499 mg/dl   ??? ??? ? ??Very High ??? ??? ??? ??? ??? >499 mg/dl      Cholesterol:       Desirable ??? ??? ??? ??? <200 mg/dl   ??? ??? Borderline High ??? 200-239 mg/dl   ??? ??? High ??? ??? ??? ??? ??? ??? >239 mg/dl      HDL Cholesterol:       High ??? ???>59 mg/dL   ??? ??? Low ??? ??? <41 mg/dL      This screening LDL is a calculated result  It does not have the accuracy of the Direct Measured LDL in the monitoring of patients with hyperlipidemia and/or statin therapy  Direct Measure LDL (IKP697) must be ordered separately in these patients  TRIGLYCERIDES 78 mg/dL  <=150   Specimen collection should occur prior to N-Acetylcysteine or Metamizole administration due to the potential for falsely depressed results       (1) TSH [id number] 09:17AM Mark Swain Order Number: [id number]     Test Name Result Flag Reference   TSH 2 860 uIU/mL  0 358-3 740   Patients undergoing fluorescein dye angiography may retain small amounts of fluorescein in the body for 48-72 hours post procedure  Samples containing fluorescein can produce falsely depressed TSH values  If the patient had this procedure,a specimen should be resubmitted post fluorescein clearance  (1) PSA (SCREEN) (Dx V76 44 Screen for Prostate Cancer) 08Sep2017 09:17AM Dayan Nguyen Order Number: [id number]     Test Name Result Flag Reference   PROSTATE SPECIFIC ANTIGEN 3 2 ng/mL  0 0-4 0   American Urological Association Guidelines define biochemical recurrence of prostate cancer as a detectable or rising PSA value post-radical prostatectomy that is greater than or equal to 0 2 ng/mL with a second confirmatory level of greater than or equal to 0 2 ng/mL       (1) HEP C ANTIBODY 08Sep2017 09:17AM Dayan Nguyen Order Number: [id number]     Test Name Result Flag Reference   HEPATITIS C ANTIBODY Non-reactive  Non-reactive

## 2018-01-16 NOTE — RESULT NOTES
Verified Results  (1) BUN 27Apr2017 08:30AM Neelima Mendoza Order Number: [id number]     Test Name Result Flag Reference   BLOOD UREA NITROGEN 27 mg/dL H 5-25     (1) CREATININE 27Apr2017 08:30AM Neelima Mendoza Order Number: [id number]     Test Name Result Flag Reference   CREATININE 1 28 mg/dL  0 60-1 30   Standardized to IDMS reference method   eGFR Non-African American 55 4 ml/min/1 73sq m     National Kidney Disease Education Program recommendations are as follows:  GFR calculation is accurate only with a steady state creatinine  Chronic Kidney disease less than 60 ml/min/1 73 sq  meters  Kidney failure less than 15 ml/min/1 73 sq  meters

## 2018-01-17 ENCOUNTER — APPOINTMENT (OUTPATIENT)
Dept: PHYSICAL THERAPY | Facility: REHABILITATION | Age: 73
End: 2018-01-17
Payer: MEDICARE

## 2018-01-17 PROCEDURE — 97110 THERAPEUTIC EXERCISES: CPT

## 2018-01-17 PROCEDURE — 97140 MANUAL THERAPY 1/> REGIONS: CPT

## 2018-01-17 NOTE — RESULT NOTES
Discussion/Summary   mild arthritis, no fracture or bone lesions     Verified Results  * XR KNEE 3 VW LEFT NON INJURY 12Jun2017 05:15PM Fredrick Soto Order Number: [id number]     Test Name Result Flag Reference   XR KNEE 3 VW LEFT (Report)     LEFT KNEE     INDICATION: Medial pain  Limited range of motion severe pain lasting 3 days following working out at [employer]  COMPARISON: None     VIEWS: 3     IMAGES: 3     FINDINGS:     There is no acute fracture or dislocation  There is no joint effusion  Mild medial compartment joint space narrowing with sharpening of the tibial spines  No lytic or blastic lesions are seen  Soft tissues are unremarkable  IMPRESSION:     Mild medial compartment osteoarthritis         Workstation performed: [id number]     Signed by:   Sánchez Martell DO   6/14/17

## 2018-01-18 ENCOUNTER — APPOINTMENT (OUTPATIENT)
Dept: PHYSICAL THERAPY | Facility: REHABILITATION | Age: 73
End: 2018-01-18
Payer: MEDICARE

## 2018-01-18 PROCEDURE — 97140 MANUAL THERAPY 1/> REGIONS: CPT

## 2018-01-18 PROCEDURE — 97112 NEUROMUSCULAR REEDUCATION: CPT

## 2018-01-18 PROCEDURE — 97110 THERAPEUTIC EXERCISES: CPT

## 2018-01-18 NOTE — RESULT NOTES
Message   i called about high chol and recommended diet changes, and consider statin if not improving, ieft message  Verified Results  (1) CBC/PLT/DIFF 31Aug2016 09:47AM Annalisa Riff     Test Name Result Flag Reference   WHITE BLOOD CELL COUNT 6 0 Thousand/uL  3 8-10 8   RED BLOOD CELL COUNT 4 14 Million/uL L 4 20-5 80   HEMOGLOBIN 13 0 g/dL L 13 2-17 1   HEMATOCRIT 38 1 % L 38 5-50 0   MCV 91 9 fL  80 0-100 0   MCH 31 3 pg  27 0-33 0   MCHC 34 1 g/dL  32 0-36 0   RDW 13 9 %  11 0-15 0   PLATELET COUNT 771 Thousand/uL  140-400   MPV 8 7 fL  7 5-11 5   ABSOLUTE NEUTROPHILS 4074 cells/uL  1882-6795   ABSOLUTE LYMPHOCYTES 1482 cells/uL  850-3900   ABSOLUTE MONOCYTES 366 cells/uL  200-950   ABSOLUTE EOSINOPHILS 60 cells/uL     ABSOLUTE BASOPHILS 18 cells/uL  0-200   NEUTROPHILS 67 9 %     LYMPHOCYTES 24 7 %     MONOCYTES 6 1 %     EOSINOPHILS 1 0 %     BASOPHILS 0 3 %       (1) COMPREHENSIVE METABOLIC PANEL [id number] 68:17VX Annalisa Riff     Test Name Result Flag Reference   GLUCOSE 89 mg/dL  65-99   Fasting reference interval   UREA NITROGEN (BUN) 25 mg/dL  7-25   CREATININE 1 24 mg/dL H 0 70-1 18   For patients >52years of age, the reference limit  for Creatinine is approximately 13% higher for people  identified as -American  eGFR NON-AFR   AMERICAN 59 mL/min/1 73m2 L > OR = 60   eGFR AFRICAN AMERICAN 68 mL/min/1 73m2  > OR = 60   BUN/CREATININE RATIO 20 (calc)  6-22   SODIUM 136 mmol/L  135-146   POTASSIUM 4 5 mmol/L  3 5-5 3   CHLORIDE 101 mmol/L     CARBON DIOXIDE 29 mmol/L  20-31   CALCIUM 9 4 mg/dL  8 6-10 3   PROTEIN, TOTAL 7 1 g/dL  6 1-8 1   ALBUMIN 4 5 g/dL  3 6-5 1   GLOBULIN 2 6 g/dL (calc)  1 9-3 7   ALBUMIN/GLOBULIN RATIO 1 7 (calc)  1 0-2 5   BILIRUBIN, TOTAL 0 6 mg/dL  0 2-1 2   ALKALINE PHOSPHATASE 46 U/L     AST 30 U/L  10-35   ALT 30 U/L  9-46     (1) LIPID PANEL, FASTING 31Aug2016 09:47AM Annalisa Riff     Test Name Result Flag Reference   CHOLESTEROL, TOTAL 230 mg/dL H 125-200   HDL CHOLESTEROL 63 mg/dL  > OR = 40   TRIGLICERIDES 82 mg/dL  <898   LDL-CHOLESTEROL 151 mg/dL (calc) H <130   Desirable range <100 mg/dL for patients with CHD or  diabetes and <70 mg/dL for diabetic patients with  known heart disease  CHOL/HDLC RATIO 3 7 (calc)  < OR = 5 0   NON HDL CHOLESTEROL 167 mg/dL (calc) H    Target for non-HDL cholesterol is 30 mg/dL higher than   LDL cholesterol target  (1) PSA (SCREEN) (Dx V76 44 Screen for Prostate Cancer) [id number] 09:47AM Birgit Lopez   REPORT COMMENT:  FASTING:YES     Test Name Result Flag Reference   PSA, TOTAL 2 6 ng/mL  < OR = 4 0   This test was performed using the Siemens  chemiluminescent method  Values obtained from  different assay methods cannot be used  interchangeably  PSA levels, regardless of  value, should not be interpreted as absolute  evidence of the presence or absence of disease

## 2018-01-18 NOTE — PROGRESS NOTES
Preliminary Nursing Report                Patient Information    Initial Encounter Entry Date:   2017 9:29 AM EST (Automated Transmission Automated Transmission)       Last Modified:   {Alison Carpio}              Legal Name: [address] Number:        YOB: 1945        Age (years): 70        Gender: M        Body Mass Index (BMI): 28 kg/m2        Height: 72 in  Weight: 209 lbs (95 kgs)           Address:   [facility] [id number]               Phone: [phone]   (consent to leave messages)        Email:        Ethnicity: Decline to State        Moravian:        Marital Status:        Preferred Language: English        Race: Other Race                    Patient Insurance Information        Primary Insurance Information Carrier Name: {Primary  CarrierName}           Carrier Address:   {Primary  CarrierAddress}              Carrier Phone: {Primary  CarrierPhone}          Group Number: {Primary  GroupNumber}          Policy Number: {Primary  PolicyNumber}          Insured Name: {Primary  InsuredName}          Insured : {Primary  InsuredDOB}          Relationship to Insured: {Primary  RelationshiptoInsured}           Secondary Insurance Information Carrier Name: {Secondary  CarrierName}           Carrier Address:   {Secondary  CarrierAddress}              Carrier Phone: {Secondary  CarrierPhone}          Group Number: {Secondary  GroupNumber}          Policy Number: {Secondary  PolicyNumber}          Insured Name: {Secondary  InsuredName}          Insured : {Secondary  InsuredDOB}          Relationship to Insured: {Secondary  RelationshiptoInsured}                       Health Profile   Booking #:   Angela Samson #: [id number]               DOS: 10/01/2017    Surgery : KNEE ARTHROSCOPY/SURGERY    Syble Yosef     Add'l Procedures/Notes:     Surgery Risk: Intermediate          Precautions          Allergies    No Known Drug Allergies Medications    Ramipril 2 5 MG Oral Capsule               Conditions    Acute medial meniscal tear, left, initial encounter       Acute pain of left knee       Advance directive discussed with patient       Back pain       Depression screen       Encounter for prostate cancer screening       Essential hypertension       Hypercholesteremia       Jaw pain       Neck pain       Need for hepatitis A vaccination       Need for vaccination with 13-polyvalent pneumococcal conjugate vaccine       Parotiditis       Screening for genitourinary condition       Thyroid nodule               Family History    None             Surgical History    None             Social History    Former smoker              Retired       Social alcohol use                               Patient Instructions       Medical Procedure Risk  NPO Instructions   The day before surgery it is recommended to have a light dinner at your usual time and you are allowed a light snack early in the evening  Do not eat anything heavy or eat a big meal after 7pm  Do not eat or drink anything after midnight prior to your surgery  If you are supposed to take any of your medications, do so with a sip of water  Failure to follow these instructions can lead to an increased risk of lung complications and may result in a delay or cancellation of your procedure  If you have any questions, contact your institution for further instructions  No candy, no gum, no mints, no chewing tobacco          Ramipril 2 5 MG Oral Capsule  Medication Instruction (ACE/ARB - Blood Pressure Medication) 1  Please continue the following medications up to the evening before surgery, but do not take it on the day of surgery  Please restart your medications as soon as clinically feasible  Testing Considerations       ? Complete Blood Count (CBC) t  If test was completed and normal within last six months, repeat test is not necessary  Triggered by: Thyroid nodule         ? Comprehensive Metabolic Panel (CMP) t  If test was completed and normal within last six months, repeat test is not necessary  Triggered by: Thyroid nodule         ? Electrocardiogram (ECG) t  Patient does not need new test if normal ECG is present within the last six months and no change in clinical condition  Triggered by: Thyroid nodule, Essential hypertension, Age or Facility Rec         ? Thyroid function tests (TFTs) t  Consider Thyroid Function Tests if there is a change in condition  Triggered by: Thyroid nodule               Consultations       ? Primary Care Physician Evaluation   Primary care physician may need to evaluate patient prior to surgery  This is likely NOT necessary if the listed conditions are chronic and stable  Triggered by: Thyroid nodule               Miscellaneous Questions         Question: Are you able to walk up a flight of stairs, walk up a hill or do heavy housework WITHOUT having chest pain or shortness of breath? Answer: YES                   Allergies/Conditions/Medications Not Found        The following were not recognized by our system when generating the recommendations  Please consider if this would impact any preoperative protocols  ?        ? Retired       ? Social alcohol use                  Appointment Information         Date:    10/01/2017        Location:    [place]        Address:           Directions:                      Footnotes revision 14      ?? Denotes a free-text entry  Legal Disclaimer: Any and all recommendations and services provided herein are designed to assist in the preoperative care of the patient  Nothing contained herein is designed to replace, eliminate or alleviate the responsibility of the attending physician to supervise and determine the patient?s preoperative care and course of treatment   Failure to provide complete, accurate information may negatively impact the system?s ability to recommend the proper preoperative protocol  THE ATTENDING PHYSICIAN IS RESPONSIBLE TO REVIEW THE SUGGESTED PREOPERATIVE PROTOCOLS/COURSE OF TREATMENT AND PRESCRIBE THE FINAL COURSE OF PREOPERATIVE TREATMENT IN CONSULTATION WITH THE PATIENT  THE ePREOP SYSTEM AND ITS MATERIALS ARE PROVIDED ? AS IS? WITHOUT WARRANTY OF ANY KIND, EXPRESS OR IMPLIED, INCLUDING, BUT NOT LIMITED TO, WARRANTIES OF PERFORMANCE OR MERCHANTABILITY OR FITNESS FOR A PARTICULAR PURPOSE  PATIENT AND PHYSICIANS HEREBY AGREE THAT THEIR USE OF THE MATERIALS AND RESOURCES ACT AS A CONSENT TO RELEASE AND WAIVE ePREOP FROM ANY AND ALL CLAIMS OF WARRANTY, TORT OR CONTRACT LAW OF ANY KIND  Electronically signed by:Kareem LEES    Sep 20 2017  2:04PM EST

## 2018-01-22 ENCOUNTER — APPOINTMENT (OUTPATIENT)
Dept: PHYSICAL THERAPY | Facility: REHABILITATION | Age: 73
End: 2018-01-22
Payer: MEDICARE

## 2018-01-22 PROCEDURE — 97110 THERAPEUTIC EXERCISES: CPT

## 2018-01-22 PROCEDURE — 97112 NEUROMUSCULAR REEDUCATION: CPT

## 2018-01-22 PROCEDURE — 97140 MANUAL THERAPY 1/> REGIONS: CPT

## 2018-01-23 VITALS
SYSTOLIC BLOOD PRESSURE: 127 MMHG | HEART RATE: 80 BPM | TEMPERATURE: 98.3 F | OXYGEN SATURATION: 99 % | BODY MASS INDEX: 27.83 KG/M2 | RESPIRATION RATE: 20 BRPM | WEIGHT: 210 LBS | HEIGHT: 73 IN | DIASTOLIC BLOOD PRESSURE: 67 MMHG

## 2018-01-25 ENCOUNTER — OFFICE VISIT (OUTPATIENT)
Dept: PHYSICAL THERAPY | Facility: REHABILITATION | Age: 73
End: 2018-01-25
Payer: MEDICARE

## 2018-01-25 DIAGNOSIS — M75.41 IMPINGEMENT SYNDROME OF RIGHT SHOULDER: Primary | ICD-10-CM

## 2018-01-25 PROCEDURE — 97140 MANUAL THERAPY 1/> REGIONS: CPT

## 2018-01-25 PROCEDURE — 97110 THERAPEUTIC EXERCISES: CPT

## 2018-01-25 PROCEDURE — 97112 NEUROMUSCULAR REEDUCATION: CPT

## 2018-01-25 NOTE — PROGRESS NOTES
Daily Note     Today's date: 2018  Patient name: Tara Curran  : 1945  MRN: [id number]  Referring provider: Jackie Franz MD  Dx:   Encounter Diagnosis   Name Primary?  Impingement syndrome of right shoulder Yes                  Subjective: Patient reports that his shoulder is very sore after LV  Objective: See treatment diary below  Precautions HTN    Specialty Daily Treatment Diary     Manual         RIGHT PROM 10 min                                           Exercise Diary         Supine IR w/ weight 1# 5" 2x10       Rows GTB 5" 2x10       B/L TB ER YTB 5" 2x10       Prone ER 5" 2x10       Prone IR 1# 5" 2x10       Prone row w/ tricep kickback 1# 3" 2x10       Modified Prone T's 5" 2x10       Prone swimmies 3x15                                   Modalities                                          Assessment: Patient reports that his shoulder feels looser post tx  Plan: Continue per plan of care

## 2018-01-30 ENCOUNTER — OFFICE VISIT (OUTPATIENT)
Dept: PHYSICAL THERAPY | Facility: REHABILITATION | Age: 73
End: 2018-01-30
Payer: MEDICARE

## 2018-01-30 DIAGNOSIS — M75.41 IMPINGEMENT SYNDROME OF RIGHT SHOULDER: Primary | ICD-10-CM

## 2018-01-30 PROCEDURE — 97112 NEUROMUSCULAR REEDUCATION: CPT

## 2018-01-30 PROCEDURE — 97110 THERAPEUTIC EXERCISES: CPT

## 2018-01-30 PROCEDURE — G8990 OTHER PT/OT CURRENT STATUS: HCPCS

## 2018-01-30 PROCEDURE — G8991 OTHER PT/OT GOAL STATUS: HCPCS

## 2018-01-30 NOTE — PROGRESS NOTES
Daily Note     Today's date: 2018  Patient name: Delvin Hudson  : 1945  MRN: [id number]  Referring provider: Kenneth Boss MD  Dx:   Encounter Diagnosis   Name Primary?  Impingement syndrome of right shoulder Yes                  Subjective: Patient states that his shoulder feels the same, no changes in the pain that he has been having  Objective: See treatment diary below  Precautions HTN    Specialty Daily Treatment Diary     Manual         RIGHT PROM 10 min                                           Exercise Diary         Supine IR w/ weight 1# 5" 2x10       Rows GTB 5" 2x10       B/L TB ER YTB 5" 2x10       Prone ER 2# 5" 2x10       Prone IR 1# 5" 2x10       Prone row w/ tricep kickback 2# 3" 2x10       Modified Prone T's 5" 2x10       Prone swimmies 3x15       Prone Ys 2x10                           Modalities                                          Assessment: Patient did well with TE, reports soreness post tx  Plan: Continue per plan of care

## 2018-02-01 ENCOUNTER — APPOINTMENT (OUTPATIENT)
Dept: PHYSICAL THERAPY | Facility: REHABILITATION | Age: 73
End: 2018-02-01
Payer: MEDICARE

## 2018-02-02 ENCOUNTER — OFFICE VISIT (OUTPATIENT)
Dept: PHYSICAL THERAPY | Facility: REHABILITATION | Age: 73
End: 2018-02-02
Payer: MEDICARE

## 2018-02-02 DIAGNOSIS — M75.41 IMPINGEMENT SYNDROME OF RIGHT SHOULDER: Primary | ICD-10-CM

## 2018-02-02 NOTE — PROGRESS NOTES
Spoke to patient who stated that he is still doing about the same  At this time recommending referral back to doctor as probable shoulder bursitis present  Fair progress made with therapy but we have reached a plateau at this time  He is being discharged from therapy at this time

## 2018-02-06 ENCOUNTER — APPOINTMENT (OUTPATIENT)
Dept: PHYSICAL THERAPY | Facility: REHABILITATION | Age: 73
End: 2018-02-06
Payer: MEDICARE

## 2018-02-08 ENCOUNTER — APPOINTMENT (OUTPATIENT)
Dept: PHYSICAL THERAPY | Facility: REHABILITATION | Age: 73
End: 2018-02-08
Payer: MEDICARE

## 2018-02-13 ENCOUNTER — OFFICE VISIT (OUTPATIENT)
Dept: OBGYN CLINIC | Facility: OTHER | Age: 73
End: 2018-02-13
Payer: MEDICARE

## 2018-02-13 VITALS
WEIGHT: 217 LBS | DIASTOLIC BLOOD PRESSURE: 76 MMHG | BODY MASS INDEX: 28.83 KG/M2 | SYSTOLIC BLOOD PRESSURE: 161 MMHG | HEART RATE: 76 BPM

## 2018-02-13 DIAGNOSIS — M75.41 IMPINGEMENT SYNDROME OF RIGHT SHOULDER: Primary | ICD-10-CM

## 2018-02-13 PROBLEM — S83.242A TEAR OF MEDIAL MENISCUS OF LEFT KNEE: Status: RESOLVED | Noted: 2017-09-19 | Resolved: 2018-02-13

## 2018-02-13 PROCEDURE — 99213 OFFICE O/P EST LOW 20 MIN: CPT | Performed by: ORTHOPAEDIC SURGERY

## 2018-02-13 RX ORDER — AZITHROMYCIN 250 MG/1
TABLET, FILM COATED ORAL DAILY
COMMUNITY
Start: 2017-12-27 | End: 2018-02-13 | Stop reason: HOSPADM

## 2018-02-13 RX ORDER — RAMIPRIL 2.5 MG/1
1 CAPSULE ORAL DAILY
COMMUNITY
End: 2018-02-13 | Stop reason: HOSPADM

## 2018-02-13 RX ORDER — SCOLOPAMINE TRANSDERMAL SYSTEM 1 MG/1
1 PATCH, EXTENDED RELEASE TRANSDERMAL
COMMUNITY
Start: 2018-01-23 | End: 2018-02-13 | Stop reason: HOSPADM

## 2018-02-13 NOTE — PROGRESS NOTES
Assessment:       1  Impingement syndrome of right shoulder          Plan:    1  Continue with home exercise program  2  We recommended MRI given exam, but patient declined because he states he is not interested in any surgical options  3  Activities as tolerated  4  F/u PRN      Subjective:     Patient ID: Viva Frankel is a 67 y o  male  Chief Complaint:    Tara Amezcua returns to the office in follow-up of his right shoulder  He received a corticosteroid injection at his last office visit on 11/30/2017  He also was sent to formal physical therapy and was discharged 2 weeks ago given his symptoms plateaued  He denies any new injury  Pain with certain motions  Pain occurs at night  No numbness or tingling  Social History     Occupational History    Not on file  Social History Main Topics    Smoking status: Former Smoker    Smokeless tobacco: Never Used    Alcohol use No    Drug use: No    Sexual activity: Not on file     The patient's medications, allergies, PMH, PSH and social history were reviewed for today's office visit  Review of Systems   Constitutional: Negative for chills and fever  HENT: Negative for hearing loss  Eyes: Negative for visual disturbance  Respiratory: Negative for shortness of breath  Cardiovascular: Negative for chest pain  Gastrointestinal: Negative for abdominal pain  Musculoskeletal:        As reviewed in the HPI   Skin: Negative for rash  Neurological:        As reviewed in the HPI   Psychiatric/Behavioral: Negative for agitation  Objective:     Right Shoulder Exam     Tenderness   The patient is experiencing no tenderness  Range of Motion   The patient has normal right shoulder ROM      Muscle Strength   External Rotation: 4/5   Supraspinatus: 4/5     Tests   Apprehension: negative  Drop Arm: negative  Hawkin's test: positive  Impingement: positive    Other   Erythema: absent  Sensation: normal  Pulse: present        Physical Exam   Constitutional: He is oriented to person, place, and time  He appears well-developed and well-nourished  HENT:   Head: Normocephalic  Eyes: EOM are normal    Neck: Normal range of motion  Pulmonary/Chest: No respiratory distress  He has no wheezes  Neurological: He is alert and oriented to person, place, and time  Skin: Skin is warm and dry  Psychiatric: He has a normal mood and affect   His behavior is normal  Judgment and thought content normal

## 2018-04-16 DIAGNOSIS — I10 ESSENTIAL HYPERTENSION: Primary | ICD-10-CM

## 2018-04-16 RX ORDER — RAMIPRIL 2.5 MG/1
CAPSULE ORAL
Qty: 90 CAPSULE | Refills: 3 | Status: SHIPPED | OUTPATIENT
Start: 2018-04-16 | End: 2019-05-28 | Stop reason: SDUPTHER

## 2018-10-08 ENCOUNTER — TELEPHONE (OUTPATIENT)
Dept: INTERNAL MEDICINE CLINIC | Facility: CLINIC | Age: 73
End: 2018-10-08

## 2018-10-08 DIAGNOSIS — Z12.5 SCREENING FOR PROSTATE CANCER: ICD-10-CM

## 2018-10-08 DIAGNOSIS — E78.00 PURE HYPERCHOLESTEROLEMIA: ICD-10-CM

## 2018-10-08 DIAGNOSIS — I10 ESSENTIAL HYPERTENSION: Primary | ICD-10-CM

## 2018-10-10 ENCOUNTER — APPOINTMENT (OUTPATIENT)
Dept: LAB | Age: 73
End: 2018-10-10
Payer: MEDICARE

## 2018-10-10 DIAGNOSIS — E78.00 PURE HYPERCHOLESTEROLEMIA: ICD-10-CM

## 2018-10-10 DIAGNOSIS — I10 ESSENTIAL HYPERTENSION: ICD-10-CM

## 2018-10-10 DIAGNOSIS — Z12.5 SCREENING FOR PROSTATE CANCER: ICD-10-CM

## 2018-10-10 LAB
ALBUMIN SERPL BCP-MCNC: 3.7 G/DL (ref 3.5–5)
ALP SERPL-CCNC: 57 U/L (ref 46–116)
ALT SERPL W P-5'-P-CCNC: 37 U/L (ref 12–78)
ANION GAP SERPL CALCULATED.3IONS-SCNC: 3 MMOL/L (ref 4–13)
AST SERPL W P-5'-P-CCNC: 26 U/L (ref 5–45)
BASOPHILS # BLD AUTO: 0.06 THOUSANDS/ΜL (ref 0–0.1)
BASOPHILS NFR BLD AUTO: 1 % (ref 0–1)
BILIRUB SERPL-MCNC: 0.67 MG/DL (ref 0.2–1)
BUN SERPL-MCNC: 23 MG/DL (ref 5–25)
CALCIUM SERPL-MCNC: 8.8 MG/DL (ref 8.3–10.1)
CHLORIDE SERPL-SCNC: 103 MMOL/L (ref 100–108)
CHOLEST SERPL-MCNC: 204 MG/DL (ref 50–200)
CO2 SERPL-SCNC: 30 MMOL/L (ref 21–32)
CREAT SERPL-MCNC: 1.19 MG/DL (ref 0.6–1.3)
EOSINOPHIL # BLD AUTO: 0.09 THOUSAND/ΜL (ref 0–0.61)
EOSINOPHIL NFR BLD AUTO: 2 % (ref 0–6)
ERYTHROCYTE [DISTWIDTH] IN BLOOD BY AUTOMATED COUNT: 13.2 % (ref 11.6–15.1)
GFR SERPL CREATININE-BSD FRML MDRD: 61 ML/MIN/1.73SQ M
GLUCOSE P FAST SERPL-MCNC: 87 MG/DL (ref 65–99)
HCT VFR BLD AUTO: 41.2 % (ref 36.5–49.3)
HDLC SERPL-MCNC: 52 MG/DL (ref 40–60)
HGB BLD-MCNC: 13.7 G/DL (ref 12–17)
IMM GRANULOCYTES # BLD AUTO: 0.01 THOUSAND/UL (ref 0–0.2)
IMM GRANULOCYTES NFR BLD AUTO: 0 % (ref 0–2)
LDLC SERPL CALC-MCNC: 137 MG/DL (ref 0–100)
LYMPHOCYTES # BLD AUTO: 1.65 THOUSANDS/ΜL (ref 0.6–4.47)
LYMPHOCYTES NFR BLD AUTO: 27 % (ref 14–44)
MCH RBC QN AUTO: 31.4 PG (ref 26.8–34.3)
MCHC RBC AUTO-ENTMCNC: 33.3 G/DL (ref 31.4–37.4)
MCV RBC AUTO: 95 FL (ref 82–98)
MONOCYTES # BLD AUTO: 0.45 THOUSAND/ΜL (ref 0.17–1.22)
MONOCYTES NFR BLD AUTO: 7 % (ref 4–12)
NEUTROPHILS # BLD AUTO: 3.86 THOUSANDS/ΜL (ref 1.85–7.62)
NEUTS SEG NFR BLD AUTO: 63 % (ref 43–75)
NRBC BLD AUTO-RTO: 0 /100 WBCS
PLATELET # BLD AUTO: 339 THOUSANDS/UL (ref 149–390)
PMV BLD AUTO: 10.4 FL (ref 8.9–12.7)
POTASSIUM SERPL-SCNC: 4 MMOL/L (ref 3.5–5.3)
PROT SERPL-MCNC: 7.7 G/DL (ref 6.4–8.2)
PSA SERPL-MCNC: 3 NG/ML (ref 0–4)
RBC # BLD AUTO: 4.36 MILLION/UL (ref 3.88–5.62)
SODIUM SERPL-SCNC: 136 MMOL/L (ref 136–145)
TRIGL SERPL-MCNC: 77 MG/DL
TSH SERPL DL<=0.05 MIU/L-ACNC: 3.1 UIU/ML (ref 0.36–3.74)
WBC # BLD AUTO: 6.12 THOUSAND/UL (ref 4.31–10.16)

## 2018-10-10 PROCEDURE — 84443 ASSAY THYROID STIM HORMONE: CPT

## 2018-10-10 PROCEDURE — 36415 COLL VENOUS BLD VENIPUNCTURE: CPT

## 2018-10-10 PROCEDURE — G0103 PSA SCREENING: HCPCS

## 2018-10-10 PROCEDURE — 80053 COMPREHEN METABOLIC PANEL: CPT

## 2018-10-10 PROCEDURE — 85025 COMPLETE CBC W/AUTO DIFF WBC: CPT

## 2018-10-10 PROCEDURE — 80061 LIPID PANEL: CPT

## 2019-01-02 ENCOUNTER — OFFICE VISIT (OUTPATIENT)
Dept: INTERNAL MEDICINE CLINIC | Facility: CLINIC | Age: 74
End: 2019-01-02
Payer: MEDICARE

## 2019-01-02 VITALS
DIASTOLIC BLOOD PRESSURE: 78 MMHG | TEMPERATURE: 98.4 F | BODY MASS INDEX: 27.57 KG/M2 | OXYGEN SATURATION: 99 % | WEIGHT: 208 LBS | SYSTOLIC BLOOD PRESSURE: 147 MMHG | HEIGHT: 73 IN | HEART RATE: 73 BPM

## 2019-01-02 DIAGNOSIS — J40 BRONCHITIS: Primary | ICD-10-CM

## 2019-01-02 DIAGNOSIS — I10 ESSENTIAL HYPERTENSION: ICD-10-CM

## 2019-01-02 PROCEDURE — 99213 OFFICE O/P EST LOW 20 MIN: CPT | Performed by: INTERNAL MEDICINE

## 2019-01-02 RX ORDER — IBUPROFEN 600 MG/1
TABLET ORAL
Refills: 0 | COMMUNITY
Start: 2018-11-20 | End: 2020-10-31 | Stop reason: HOSPADM

## 2019-01-02 RX ORDER — AZITHROMYCIN 250 MG/1
250 TABLET, FILM COATED ORAL EVERY 24 HOURS
Qty: 6 TABLET | Refills: 0 | Status: SHIPPED | OUTPATIENT
Start: 2019-01-02 | End: 2019-01-07

## 2019-01-02 NOTE — ASSESSMENT & PLAN NOTE
With some slight abnormality left lower lobe, will start treatment with azithromycin, follow up if not improving  Discussed with patient reasons to go to the hospital, none of which are present currently, but discussed with patient and wife what to look for and to seek medical attention if they were to occur

## 2019-01-02 NOTE — PROGRESS NOTES
Assessment/Plan:    Bronchitis  With some slight abnormality left lower lobe, will start treatment with azithromycin, follow up if not improving  Discussed with patient reasons to go to the hospital, none of which are present currently, but discussed with patient and wife what to look for and to seek medical attention if they were to occur  Essential hypertension  Slightly elevated today, continue med and monitoring       Diagnoses and all orders for this visit:    Bronchitis  -     azithromycin (ZITHROMAX) 250 mg tablet; Take 1 tablet (250 mg total) by mouth every 24 hours for 5 days 2 tabs first day, then one tab daily    Essential hypertension    Other orders  -     ibuprofen (MOTRIN) 600 mg tablet; TK 1 T PO Q 8 H PRF PAIN          Subjective:      Patient ID: Shruti Orozco is a 68 y o  male  Onset about 5 days ago of cough, then developing more head congestion, runny nose, no facial pain or pressure, no foul-smelling mucus, no pleuritic pain, no fevers chills or sweats, mild sore throat  Wife is also sick        The following portions of the patient's history were reviewed and updated as appropriate: allergies, current medications, past family history, past medical history, past social history, past surgical history and problem list     Review of Systems   Constitutional: Negative for chills and fever  HENT: Positive for congestion, postnasal drip, rhinorrhea and sore throat  Negative for ear pain, sinus pain and sinus pressure  Respiratory: Positive for cough  Negative for shortness of breath  Cardiovascular: Negative for chest pain  Objective:      /78   Pulse 73   Temp 98 4 °F (36 9 °C)   Ht 6' 1" (1 854 m)   Wt 94 3 kg (208 lb)   SpO2 99%   BMI 27 44 kg/m²          Physical Exam   Constitutional: He appears well-developed and well-nourished  HENT:   Head: Normocephalic and atraumatic  Mouth/Throat: Oropharynx is clear and moist  No oropharyngeal exudate     Waxin right canal, left tympanic membrane normal   Eyes: Conjunctivae are normal    Neck: Normal range of motion  Neck supple  Pulmonary/Chest: Effort normal and breath sounds normal  No respiratory distress  He has no wheezes  He has no rales     Slightly rough breath sounds at left base when coughing

## 2019-01-17 ENCOUNTER — OFFICE VISIT (OUTPATIENT)
Dept: OBGYN CLINIC | Facility: OTHER | Age: 74
End: 2019-01-17
Payer: MEDICARE

## 2019-01-17 ENCOUNTER — APPOINTMENT (OUTPATIENT)
Dept: RADIOLOGY | Facility: OTHER | Age: 74
End: 2019-01-17
Payer: MEDICARE

## 2019-01-17 VITALS
HEART RATE: 55 BPM | SYSTOLIC BLOOD PRESSURE: 145 MMHG | HEIGHT: 73 IN | DIASTOLIC BLOOD PRESSURE: 88 MMHG | BODY MASS INDEX: 27.44 KG/M2

## 2019-01-17 DIAGNOSIS — M18.12 PRIMARY OSTEOARTHRITIS OF FIRST CARPOMETACARPAL JOINT OF LEFT HAND: Primary | ICD-10-CM

## 2019-01-17 DIAGNOSIS — M79.645 PAIN OF LEFT THUMB: ICD-10-CM

## 2019-01-17 DIAGNOSIS — M79.9 SOFT TISSUE LESION: ICD-10-CM

## 2019-01-17 PROCEDURE — 73140 X-RAY EXAM OF FINGER(S): CPT

## 2019-01-17 PROCEDURE — 99214 OFFICE O/P EST MOD 30 MIN: CPT | Performed by: FAMILY MEDICINE

## 2019-01-17 NOTE — PATIENT INSTRUCTIONS
Explained the patient that he has a ganglion cyst most likely at the base of his thumb overlying his 1st CMC joint  Explained he has arthritis in his [facility] joint which can cause increased fluid in the joint which herniate out informs a cyst   I recommended follow-up for ultrasound evaluation  Arthritis of the hand at the base of the thumb joint (first carpometacarpal joint aka 1st CMC osteoarthritis) is common  Treatment involves thumb spica splinting to reduce movement and aggravation of the joint and anti-inflammatories  Topical anti-inflammatory creams such as diclofenac are preferred over oral anti-inflammatories such as motrin/ibuprofen/advil since new evidence does support safe use of topical anti-inflammatories even in people who cannot take oral anti-inflammatories due to pre-existing medical problems that have been classically associated with worsening symptoms when taking oral anti-inflammatories  Hand exercises also are of benefit, and, if required, a referral to occupational therapy  Steroid injections also appear to offer relief for up to 3 months in some finger joints but evidence is lacking for significant relief with injection of the thumb joints  Anecdotally, however, I have noticed patients to have significant relief after thumb CMC joint arthritis in my own practice, and as such, I offer such steroid injections every 3 months for patients with moderate to severe symptoms  Up to 25% of patients may have fewer symptoms at 6 years from diagnosis but up to 50% have worse symptoms (HESHAM [facility] 2018)

## 2019-01-17 NOTE — PROGRESS NOTES
1  Primary osteoarthritis of first carpometacarpal joint of left hand     2  Pain of left thumb  XR thumb left first digit-min 2v   3  Soft tissue lesion       Orders Placed This Encounter   Procedures    XR thumb left first digit-min 2v        Imaging Studies (I personally reviewed images in PACS and report):  X-ray left thumb 01/17/2019:  No acute osseous abnormality  First CMC joint moderate osteoarthritis  There is soft tissue bulging adjacent to 1st [facility] joint    IMPRESSION:  Moderate 1st CMC joint osteoarthritis left thumb  Soft tissue lesion 1st CMC joint likely Ganglion cyst left 1st [facility] joint      Return for Follow-up ultrasound slot 01/25/2019 or later  Patient Instructions   Explained the patient that he has a ganglion cyst most likely at the base of his thumb overlying his 1st CMC joint  Explained he has arthritis in his [facility] joint which can cause increased fluid in the joint which herniate out informs a cyst   I recommended follow-up for ultrasound evaluation  Arthritis of the hand at the base of the thumb joint (first carpometacarpal joint aka 1st CMC osteoarthritis) is common  Treatment involves thumb spica splinting to reduce movement and aggravation of the joint and anti-inflammatories  Topical anti-inflammatory creams such as diclofenac are preferred over oral anti-inflammatories such as motrin/ibuprofen/advil since new evidence does support safe use of topical anti-inflammatories even in people who cannot take oral anti-inflammatories due to pre-existing medical problems that have been classically associated with worsening symptoms when taking oral anti-inflammatories  Hand exercises also are of benefit, and, if required, a referral to occupational therapy  Steroid injections also appear to offer relief for up to 3 months in some finger joints but evidence is lacking for significant relief with injection of the thumb joints   Anecdotally, however, I have noticed patients to have significant relief after thumb CMC joint arthritis in my own practice, and as such, I offer such steroid injections every 3 months for patients with moderate to severe symptoms  Up to 25% of patients may have fewer symptoms at 6 years from diagnosis but up to 50% have worse symptoms ([facility] 2018)  CHIEF COMPLAINT:  Left thumb bulging and mild pain    HPI:  Gonzalo Short is a 68 y o  male  who presents for       Visit 01/17/2019  Evaluation left thumb pain described as soreness that began approximately 2- 3 months ago  Patient tells me does have a growth of developed at the base of his thumb metacarpal region as mildly sore to touch  Denies any redness  Denies any drainage  Denies any injury  Mild soreness exacerbated by movement of thumb  Review of Systems   Constitutional: Negative for chills, fever and unexpected weight change  HENT: Negative for hearing loss, nosebleeds and sore throat  Eyes: Negative for pain, redness and visual disturbance  Respiratory: Negative for cough, shortness of breath and wheezing  Cardiovascular: Negative for chest pain, palpitations and leg swelling  Gastrointestinal: Negative for abdominal distention, nausea and vomiting  Endocrine: Negative for polydipsia and polyuria  Genitourinary: Negative for dysuria and hematuria  Skin: Negative for rash and wound  Neurological: Negative for dizziness, numbness and headaches  Psychiatric/Behavioral: Negative for decreased concentration and suicidal ideas           Following history reviewed and update:    Past Medical History:   Diagnosis Date    Hypertension     Thyroid nodule      Past Surgical History:   Procedure Laterality Date    KNEE ARTHROSCOPY      SD KNEE SCOPE,MED/LAT MENISECTOMY Left 9/19/2017    Procedure: KNEE ARTHROSCOPY; PARTIAL MEDIAL MENISCECTOMY;  Surgeon: Ivelisse Anderson MD;  [facility]: [facility];  Service: Orthopedics     Social History   History   Alcohol Use No History   Drug Use No     History   Smoking Status    Former Smoker   Smokeless Tobacco    Never Used     No family history on file  No Known Allergies       Physical Exam  /88   Pulse 55   Ht 6' 1" (1 854 m)   BMI 27 44 kg/m²     Constitutional:  see vital signs  Gen: well-developed, normocephalic/atraumatic, well-groomed  Eyes: No inflammation or discharge of conjunctiva or lids; sclera clear   Pharynx: no inflammation, lesion, or mass of lips  Neck: supple, no masses, non-distended  MSK: no inflammation, lesion, mass, or clubbing of nails and digits except for other than mentioned below  SKIN: no visible rashes or skin lesions  Pulmonary/Chest: Effort normal  No respiratory distress  NEURO: cranial nerves grossly intact  PSYCH:  Alert and oriented to person, place, and time; recent and remote memory intact; mood normal, no depression, anxiety, or agitation, judgment and insight good and intact     Ortho Exam      Left Wrist  no swelling, erythema, or increased warmth  rom full  nontender  no laxity of joint; druj stable    Left Hand  no erythema  swelling:  None  tenderness:  None  rom fingers mcp, pip, dip intact without pain  No digital scissoring or deviation of fingers  no extensor lag  no rotation of fingers  no joint laxity  strenght flexion and extension mcp, pip, dip 5/5  sensation intact  capillary refill intact      LEFT THUMB:  Localized dime size swelling adjacent 1st CMC joint with no erythema or increased warmth  Mildly tender    Erythema: no  Swelling: no  Increased Warmth: no  Tenderness: + 1st CMC joint  ROM: intact flexion, extension  Malrotation: none; thumb perpendicular to remainig phalanges on adduction  Varus stress: no laxity or pain  Valgus stress: no laxity or pain   Distal Phalanx Strength: 5/5 flexion, extension  Proximal Phalanx Strength: 5/5 flexion, extension  Thumb Abduction: 5/5  Thumb Adduction: 5/5  OK sign: normal  Froment Sign: negative   Axial load test: Negative    Procedures

## 2019-01-25 ENCOUNTER — OFFICE VISIT (OUTPATIENT)
Dept: OBGYN CLINIC | Facility: OTHER | Age: 74
End: 2019-01-25
Payer: MEDICARE

## 2019-01-25 VITALS — SYSTOLIC BLOOD PRESSURE: 160 MMHG | HEART RATE: 76 BPM | DIASTOLIC BLOOD PRESSURE: 82 MMHG

## 2019-01-25 DIAGNOSIS — M18.12 PRIMARY OSTEOARTHRITIS OF FIRST CARPOMETACARPAL JOINT OF LEFT HAND: Primary | ICD-10-CM

## 2019-01-25 DIAGNOSIS — M67.40 GANGLION CYST: ICD-10-CM

## 2019-01-25 PROCEDURE — 99214 OFFICE O/P EST MOD 30 MIN: CPT | Performed by: FAMILY MEDICINE

## 2019-01-25 PROCEDURE — 20604 DRAIN/INJ JOINT/BURSA W/US: CPT | Performed by: FAMILY MEDICINE

## 2019-01-25 RX ORDER — TRIAMCINOLONE ACETONIDE 40 MG/ML
20 INJECTION, SUSPENSION INTRA-ARTICULAR; INTRAMUSCULAR
Status: COMPLETED | OUTPATIENT
Start: 2019-01-25 | End: 2019-01-25

## 2019-01-25 RX ORDER — LIDOCAINE HYDROCHLORIDE 10 MG/ML
0.5 INJECTION, SOLUTION INFILTRATION; PERINEURAL
Status: COMPLETED | OUTPATIENT
Start: 2019-01-25 | End: 2019-01-25

## 2019-01-25 RX ADMIN — TRIAMCINOLONE ACETONIDE 20 MG: 40 INJECTION, SUSPENSION INTRA-ARTICULAR; INTRAMUSCULAR at 10:48

## 2019-01-25 RX ADMIN — LIDOCAINE HYDROCHLORIDE 0.5 ML: 10 INJECTION, SOLUTION INFILTRATION; PERINEURAL at 10:48

## 2019-01-25 NOTE — PROGRESS NOTES
1  Primary osteoarthritis of first carpometacarpal joint of left hand  Small joint arthrocentesis   2  Ganglion cyst       Orders Placed This Encounter   Procedures    Small joint arthrocentesis        Imaging Studies (I personally reviewed images in PACS and report):    Past Diagnostics:  X-ray left thumb 01/17/2019:  No acute osseous abnormality  First CMC joint moderate osteoarthritis  There is soft tissue bulging adjacent to 1st [facility] joint    IMPRESSION:  Moderate 1st CMC joint osteoarthritis left thumb  Soft tissue lesion 1st CMC joint likely Ganglion cyst left 1st CMC joint      Return if symptoms worsen or fail to improve  Patient Instructions   I explained the patient that we did attempt aspiration of his ganglion cyst diagnosis on ultrasound today with two attempts but this failed  We then proceeded with corticosteroid injection into 1st [facility] joint and explained the patient that corticosteroid into this joint may result in shrinkage or resolution of his ganglion cyst but this does not always occur  Explained if he has any persistent cyst then we will refer him to hand surgeon to consider operative excision  Patient expressed understanding and agreed to plan  Arthritis of the hand at the base of the thumb joint (first carpometacarpal joint aka 1st CMC osteoarthritis) is common  Treatment involves thumb spica splinting to reduce movement and aggravation of the joint and anti-inflammatories  Topical anti-inflammatory creams such as diclofenac are preferred over oral anti-inflammatories such as motrin/ibuprofen/advil since new evidence does support safe use of topical anti-inflammatories even in people who cannot take oral anti-inflammatories due to pre-existing medical problems that have been classically associated with worsening symptoms when taking oral anti-inflammatories  Hand exercises also are of benefit, and, if required, a referral to occupational therapy   Steroid injections also appear to offer relief for up to 3 months in some finger joints but evidence is lacking for significant relief with injection of the thumb joints  Anecdotally, however, I have noticed patients to have significant relief after thumb CMC joint arthritis in my own practice, and as such, I offer such steroid injections every 3 months for patients with moderate to severe symptoms  Up to 25% of patients may have fewer symptoms at 6 years from diagnosis but up to 50% have worse symptoms ([facility] 2018)  reviewed red flags with patient regarding injection including infection, skin dimpling, hypo-pigmentation, and nerve damage  patient expressed understanding and agreed to proceed with procedure  educated if any symptoms including fevers, chills, swelling, or worsening symptoms occur then to call office or go to hospital for immediate care if physician unavailable  patient expressed understanding and agreed to plan  CHIEF COMPLAINT:  F/u ganglion cyst left thumb and 1st cmc joint injection    HPI:  Reta Primrose is a 68 y o  male  who presents for       Visit 01/17/2019  Evaluation left thumb pain described as soreness that began approximately 2- 3 months ago  Patient tells me does have a growth of developed at the base of his thumb metacarpal region as mildly sore to touch  Denies any redness  Denies any drainage  Denies any injury  Mild soreness exacerbated by movement of thumb  Visit 1/25/19:  F/u left thumb likely ganglion cyst and 1st CMC OA: stable  Only mild intermittent pain  Review of Systems   Constitutional: Negative for chills and fever  Neurological: Negative for weakness and numbness           Following history reviewed and update:    Past Medical History:   Diagnosis Date    Hypertension     Thyroid nodule      Past Surgical History:   Procedure Laterality Date    KNEE ARTHROSCOPY      [place] KNEE SCOPE,MED/LAT MENISECTOMY Left 9/19/2017    Procedure: KNEE ARTHROSCOPY; PARTIAL MEDIAL MENISCECTOMY;  Surgeon: Viridiana Patterson MD;  Location[facility];  Service: Orthopedics     Social History   History   Alcohol Use No     History   Drug Use No     History   Smoking Status    Former Smoker   Smokeless Tobacco    Never Used     No family history on file  No Known Allergies       Physical Exam  /82 (BP Location: Right arm, Patient Position: Sitting, Cuff Size: Adult)   Pulse 76     Constitutional:  see vital signs  Gen: well-developed, normocephalic/atraumatic, well-groomed  Eyes: No inflammation or discharge of conjunctiva or lids; sclera clear   Pharynx: no inflammation, lesion, or mass of lips  Neck: supple, no masses, non-distended  MSK: no inflammation, lesion, mass, or clubbing of nails and digits except for other than mentioned below  SKIN: no visible rashes or skin lesions  Pulmonary/Chest: Effort normal  No respiratory distress  NEURO: cranial nerves grossly intact  PSYCH:  Alert and oriented to person, place, and time; recent and remote memory intact; mood normal, no depression, anxiety, or agitation, judgment and insight good and intact       Ortho Exam    LEFT THUMB:  nontender dime sized base of 1st MC no erythema  Nontender     Erythema: no  Swelling: no  Increased Warmth: no  Tenderness: mild 1st CMC joint  ROM: intact flexion, extension  Malrotation: none; thumb perpendicular to remainig phalanges on adduction  OK sign: normal  Froment Sign: negative    Small joint arthrocentesis  Date/Time: 1/25/2019 10:48 AM  Consent given by: patient  Site marked: site marked  Timeout: Immediately prior to procedure a time out was called to verify the correct patient, procedure, equipment, support staff and site/side marked as required   Supporting Documentation  Indications: diagnostic evaluation and pain   Procedure Details  Location: thumb - L thumb CMC  Preparation: Patient was prepped and draped in the usual sterile fashion (Prepped with Betadine and chlorhexidine  )  Needle size: 25 G  Ultrasound guidance: yes  Medications administered: 0 5 mL lidocaine 1 %; 20 mg triamcinolone acetonide 40 mg/mL    Aspirate amount: 0 mL  Patient tolerance: patient tolerated the procedure well with no immediate complications  Dressing:  Sterile dressing applied          Ultrasound-guided 1st CMC joint (1st MC-Trapezium) Injection:  performed with sterile field, gloves, probe cover using anesthetic and corticosteroid with hockey stick probe long axis to 1st MC  Prior to injectate needle, a 22g needle advanced into ganglion cyst SAX to probe failed produce aspirate  reattempt LAX to probe failed to produce aspirate and aspiration aborted  Separate 25g Needle advanced sax to probe into 1st cmc joint  No artery or nerve visualized in field of needle  Injectate visualized filling 1st CMC joint  Patient tolerated well and had significant improvement of symptoms on repeat axial grind test after procedure performed  Sterile bandage placed  Minimal to no bleeding       Dr Kellee Joya performing procedure under supervision of Dr Hailee Langley

## 2019-01-25 NOTE — PATIENT INSTRUCTIONS
I explained the patient that we did attempt aspiration of his ganglion cyst diagnosis on ultrasound today with two attempts but this failed  We then proceeded with corticosteroid injection into 1st [facility] joint and explained the patient that corticosteroid into this joint may result in shrinkage or resolution of his ganglion cyst but this does not always occur  Explained if he has any persistent cyst then we will refer him to hand surgeon to consider operative excision  Patient expressed understanding and agreed to plan  Arthritis of the hand at the base of the thumb joint (first carpometacarpal joint aka 1st CMC osteoarthritis) is common  Treatment involves thumb spica splinting to reduce movement and aggravation of the joint and anti-inflammatories  Topical anti-inflammatory creams such as diclofenac are preferred over oral anti-inflammatories such as motrin/ibuprofen/advil since new evidence does support safe use of topical anti-inflammatories even in people who cannot take oral anti-inflammatories due to pre-existing medical problems that have been classically associated with worsening symptoms when taking oral anti-inflammatories  Hand exercises also are of benefit, and, if required, a referral to occupational therapy  Steroid injections also appear to offer relief for up to 3 months in some finger joints but evidence is lacking for significant relief with injection of the thumb joints  Anecdotally, however, I have noticed patients to have significant relief after thumb CMC joint arthritis in my own practice, and as such, I offer such steroid injections every 3 months for patients with moderate to severe symptoms  Up to 25% of patients may have fewer symptoms at 6 years from diagnosis but up to 50% have worse symptoms (UTNABEEL Hayward 2018)  reviewed red flags with patient regarding injection including infection, skin dimpling, hypo-pigmentation, and nerve damage   patient expressed understanding and agreed to proceed with procedure  educated if any symptoms including fevers, chills, swelling, or worsening symptoms occur then to call office or go to hospital for immediate care if physician unavailable  patient expressed understanding and agreed to plan

## 2019-05-28 DIAGNOSIS — I10 ESSENTIAL HYPERTENSION: ICD-10-CM

## 2019-05-28 RX ORDER — RAMIPRIL 2.5 MG/1
CAPSULE ORAL
Qty: 90 CAPSULE | Refills: 3 | Status: SHIPPED | OUTPATIENT
Start: 2019-05-28 | End: 2020-04-20

## 2019-07-16 ENCOUNTER — HOSPITAL ENCOUNTER (OUTPATIENT)
Dept: RADIOLOGY | Facility: HOSPITAL | Age: 74
Discharge: HOME | End: 2019-07-16
Attending: ORTHOPAEDIC SURGERY
Payer: MEDICARE

## 2019-07-16 ENCOUNTER — OFFICE VISIT (OUTPATIENT)
Dept: OBGYN CLINIC | Facility: HOSPITAL | Age: 74
End: 2019-07-16
Payer: MEDICARE

## 2019-07-16 VITALS
HEART RATE: 71 BPM | WEIGHT: 209 LBS | DIASTOLIC BLOOD PRESSURE: 78 MMHG | BODY MASS INDEX: 28.31 KG/M2 | HEIGHT: 72 IN | SYSTOLIC BLOOD PRESSURE: 156 MMHG

## 2019-07-16 DIAGNOSIS — M25.551 PAIN IN RIGHT HIP: ICD-10-CM

## 2019-07-16 DIAGNOSIS — M25.551 PAIN IN RIGHT HIP: Primary | ICD-10-CM

## 2019-07-16 DIAGNOSIS — M70.61 TROCHANTERIC BURSITIS OF RIGHT HIP: ICD-10-CM

## 2019-07-16 PROCEDURE — 20610 DRAIN/INJ JOINT/BURSA W/O US: CPT | Performed by: ORTHOPAEDIC SURGERY

## 2019-07-16 PROCEDURE — 99214 OFFICE O/P EST MOD 30 MIN: CPT | Performed by: ORTHOPAEDIC SURGERY

## 2019-07-16 PROCEDURE — 73502 X-RAY EXAM HIP UNI 2-3 VIEWS: CPT

## 2019-07-16 RX ORDER — EFINACONAZOLE 100 MG/ML
SOLUTION TOPICAL
Refills: 8 | COMMUNITY
Start: 2019-06-06 | End: 2020-11-02 | Stop reason: ALTCHOICE

## 2019-07-16 RX ORDER — BUPIVACAINE HYDROCHLORIDE 2.5 MG/ML
2 INJECTION, SOLUTION INFILTRATION; PERINEURAL
Status: COMPLETED | OUTPATIENT
Start: 2019-07-16 | End: 2019-07-16

## 2019-07-16 RX ORDER — CLOTRIMAZOLE AND BETAMETHASONE DIPROPIONATE 10; .64 MG/G; MG/G
CREAM TOPICAL
Refills: 2 | COMMUNITY
Start: 2019-06-06

## 2019-07-16 RX ORDER — LIDOCAINE HYDROCHLORIDE 5 MG/ML
2 INJECTION, SOLUTION INFILTRATION; PERINEURAL
Status: COMPLETED | OUTPATIENT
Start: 2019-07-16 | End: 2019-07-16

## 2019-07-16 RX ORDER — BETAMETHASONE SODIUM PHOSPHATE AND BETAMETHASONE ACETATE 3; 3 MG/ML; MG/ML
12 INJECTION, SUSPENSION INTRA-ARTICULAR; INTRALESIONAL; INTRAMUSCULAR; SOFT TISSUE
Status: COMPLETED | OUTPATIENT
Start: 2019-07-16 | End: 2019-07-16

## 2019-07-16 RX ADMIN — BUPIVACAINE HYDROCHLORIDE 2 ML: 2.5 INJECTION, SOLUTION INFILTRATION; PERINEURAL at 15:05

## 2019-07-16 RX ADMIN — LIDOCAINE HYDROCHLORIDE 2 ML: 5 INJECTION, SOLUTION INFILTRATION; PERINEURAL at 15:05

## 2019-07-16 RX ADMIN — BETAMETHASONE SODIUM PHOSPHATE AND BETAMETHASONE ACETATE 12 MG: 3; 3 INJECTION, SUSPENSION INTRA-ARTICULAR; INTRALESIONAL; INTRAMUSCULAR; SOFT TISSUE at 15:05

## 2019-07-16 NOTE — PROGRESS NOTES
68 y o male presents for evaluation of lateral right hip pain  Occurs with back pain, occurs without leg pain  He describes very little pain in the groin  The pain is primarily lateral based in nature, it is worse during long drives, and prolonged sitting  He has no difficulties getting in and out of his car  He has no difficulties putting on shoe wear in the morning  Review of Systems  Review of systems negative unless otherwise specified in HPI    Past Medical History  Past Medical History:   Diagnosis Date    Hypertension     Thyroid nodule        Past Surgical History  Past Surgical History:   Procedure Laterality Date    KNEE ARTHROSCOPY      [place] KNEE SCOPE,MED/LAT MENISECTOMY Left 9/19/2017    Procedure: KNEE ARTHROSCOPY; PARTIAL MEDIAL MENISCECTOMY;  Surgeon: Joni Garcia MD;  [facility];  Service: Orthopedics       Current Medications  Current Outpatient Medications on File Prior to Visit   Medication Sig Dispense Refill    clotrimazole-betamethasone (LOTRISONE) 1-0 05 % cream KATEY 2 GRAMS EXT AA BID  2    ibuprofen (MOTRIN) 600 mg tablet TK 1 T PO Q 8 H PRF PAIN  0    JUBLIA 10 % SOLN KATEY 1 GTT EXT AA QD  8    ramipril (ALTACE) 2 5 mg capsule TAKE 1 CAPSULE DAILY 90 capsule 3     No current facility-administered medications on file prior to visit  Recent Labs [facility])  0   Lab Value Date/Time    HCT 41 2 10/10/2018 0649    HCT 38 1 (L) 08/31/2016 0947    HGB 13 7 10/10/2018 0649    HGB 13 0 (L) 08/31/2016 0947    WBC 6 12 10/10/2018 0649    WBC 6 0 08/31/2016 0947         Physical exam  · General: Awake, Alert, Oriented  · Eyes: Pupils equal, round and reactive to light  · Heart: regular rate and rhythm  · Lungs: No audible wheezing  · Abdomen: soft  Gait pattern is without antalgia  His lumbar spine is healed scars  There is limitation spinal flexibility  His right hip has intact soft tissues to visual inspection    There is no erythema or fluctuance  There is good right hip motion all without groin pain  There is significant tenderness the patient the trochanteric flare  There is no right thigh atrophy  There is no right knee effusion  There is no limb of the quality    Imaging  I have personally reviewed x-rays of the right hip and my interpretation is as follows:  Very little joint space narrowing seen around the right and left hips    Procedure  Injection of corticosteroid is provided for the right trochanteric bursa  It is documented below      Large joint arthrocentesis: R greater trochanteric bursa  Date/Time: 7/16/2019 3:05 PM  Consent given by: patient  Supporting Documentation  Indications: pain   Procedure Details  Location: hip - R greater trochanteric bursa  Needle size: 22 G  Approach: lateral  Medications administered: 2 mL bupivacaine 0 25 %; 2 mL lidocaine 0 5 %; 12 mg betamethasone acetate-betamethasone sodium phosphate 6 (3-3) mg/mL    Patient tolerance: patient tolerated the procedure well with no immediate complications  Dressing:  Sterile dressing applied            Assessment/Plan:   73 y o male who has lateral right hip pain that is consistent with trochanteric bursitis  He has very little arthritis in the right hip  He has undergone 2 previous lumbar decompressions family with nerve root pain which she feels is convincingly not this  Treatment options for trochanteric bursitis were discussed in detail  An injection of corticosteroid is indicated for pain relief purposes  It is advised, except, administers outlined above    I would welcome the opportunity see this patient back in the office in 3 months time for follow-up

## 2019-10-15 ENCOUNTER — OFFICE VISIT (OUTPATIENT)
Dept: OBGYN CLINIC | Facility: HOSPITAL | Age: 74
End: 2019-10-15
Payer: MEDICARE

## 2019-10-15 VITALS
HEART RATE: 73 BPM | HEIGHT: 72 IN | SYSTOLIC BLOOD PRESSURE: 175 MMHG | WEIGHT: 210 LBS | BODY MASS INDEX: 28.44 KG/M2 | DIASTOLIC BLOOD PRESSURE: 75 MMHG

## 2019-10-15 DIAGNOSIS — M70.61 TROCHANTERIC BURSITIS OF RIGHT HIP: Primary | ICD-10-CM

## 2019-10-15 PROCEDURE — 99213 OFFICE O/P EST LOW 20 MIN: CPT | Performed by: ORTHOPAEDIC SURGERY

## 2019-10-15 PROCEDURE — 20610 DRAIN/INJ JOINT/BURSA W/O US: CPT | Performed by: ORTHOPAEDIC SURGERY

## 2019-10-15 RX ORDER — BETAMETHASONE SODIUM PHOSPHATE AND BETAMETHASONE ACETATE 3; 3 MG/ML; MG/ML
12 INJECTION, SUSPENSION INTRA-ARTICULAR; INTRALESIONAL; INTRAMUSCULAR; SOFT TISSUE
Status: COMPLETED | OUTPATIENT
Start: 2019-10-15 | End: 2019-10-15

## 2019-10-15 RX ORDER — BUPIVACAINE HYDROCHLORIDE 2.5 MG/ML
2 INJECTION, SOLUTION INFILTRATION; PERINEURAL
Status: COMPLETED | OUTPATIENT
Start: 2019-10-15 | End: 2019-10-15

## 2019-10-15 RX ORDER — LIDOCAINE HYDROCHLORIDE 10 MG/ML
2 INJECTION, SOLUTION INFILTRATION; PERINEURAL
Status: COMPLETED | OUTPATIENT
Start: 2019-10-15 | End: 2019-10-15

## 2019-10-15 RX ADMIN — LIDOCAINE HYDROCHLORIDE 2 ML: 10 INJECTION, SOLUTION INFILTRATION; PERINEURAL at 13:31

## 2019-10-15 RX ADMIN — BETAMETHASONE SODIUM PHOSPHATE AND BETAMETHASONE ACETATE 12 MG: 3; 3 INJECTION, SUSPENSION INTRA-ARTICULAR; INTRALESIONAL; INTRAMUSCULAR; SOFT TISSUE at 13:31

## 2019-10-15 RX ADMIN — BUPIVACAINE HYDROCHLORIDE 2 ML: 2.5 INJECTION, SOLUTION INFILTRATION; PERINEURAL at 13:31

## 2019-10-15 NOTE — PROGRESS NOTES
Assessment:  1  Trochanteric bursitis of right hip         Plan:  The patient was provided with right trochanteric bursa steroid injection  He should follow up in 3 months  To do next visit:  Return in about 3 months (around 1/15/2020)  The above stated was discussed in layman's terms and the patient expressed understanding  All questions were answered to the patient's satisfaction  Scribe Attestation    I,:   Darren Wright am acting as a scribe while in the presence of the attending physician :        I,:   Monique Tong MD personally performed the services described in this documentation    as scribed in my presence :              Subjective:   Shahzad Bates is a 68 y o  male who presents for follow up of right hip pain  He is s/p right trochanteric bursa steroid injection with 2-3 weeks of relief, 716/19  Today he complains of right lateral hip pain  He rates his pain at 5/10  Prolonged activity aggravates  Stretches can alleviate  He denies medications for this issue  Review of systems negative unless otherwise specified in HPI    Past Medical History:   Diagnosis Date    Hypertension     Thyroid nodule        Past Surgical History:   Procedure Laterality Date    KNEE ARTHROSCOPY      [place] KNEE SCOPE,MED/LAT MENISECTOMY Left 9/19/2017    Procedure: KNEE ARTHROSCOPY; PARTIAL MEDIAL MENISCECTOMY;  Surgeon: Norberto Hyatt MD;  [facility];  Service: Orthopedics       No family history on file      Social History     Occupational History    Not on file   Tobacco Use    Smoking status: Former Smoker    Smokeless tobacco: Never Used   Substance and Sexual Activity    Alcohol use: No    Drug use: No    Sexual activity: Not on file         Current Outpatient Medications:     clotrimazole-betamethasone (LOTRISONE) 1-0 05 % cream, KATEY 2 GRAMS EXT AA BID, Disp: , Rfl: 2    ibuprofen (MOTRIN) 600 mg tablet, TK 1 T PO Q 8 H PRF PAIN, Disp: , Rfl: 0    JUBLIA 10 % SOLN, KATEY 1 GTT EXT AA QD, Disp: , Rfl: 8    ramipril (ALTACE) 2 5 mg capsule, TAKE 1 CAPSULE DAILY, Disp: 90 capsule, Rfl: 3    No Known Allergies         Vitals:    10/15/19 1302   BP: (!) 175/75   Pulse: 73       Objective:  Physical exam  · General: Awake, Alert, Oriented  · Eyes: Pupils equal, round and reactive to light  · Heart: regular rate and rhythm  · Lungs: No audible wheezing  · Abdomen: soft                    Ortho Exam   Right hip:  TTP trochanteric bursa  Good arc of motion  Patient sits comfortably in chair with hip flexed at 90 degrees  Patient stands from seated position without assistance  Calf compartments soft and supple  Sensation intact  Toes are warm sensate and mobile    Diagnostics, reviewed and taken today if performed as documented:    None performed physcian has personally reviewed the pertinent films in PACS and interpretation is as follows:      Procedures, if performed today:    Large joint arthrocentesis: R greater trochanteric bursa  Date/Time: 10/15/2019 1:31 PM  Consent given by: patient  Site marked: site marked  Supporting Documentation  Indications: pain   Procedure Details  Location: hip - R greater trochanteric bursa  Needle size: 22 G  Ultrasound guidance: no  Approach: lateral  Medications administered: 12 mg betamethasone acetate-betamethasone sodium phosphate 6 (3-3) mg/mL; 2 mL bupivacaine 0 25 %; 2 mL lidocaine 1 %    Patient tolerance: patient tolerated the procedure well with no immediate complications  Dressing:  Sterile dressing applied            Portions of the record may have been created with voice recognition software  Occasional wrong word or "sound a like" substitutions may have occurred due to the inherent limitations of voice recognition software  Read the chart carefully and recognize, using context, where substitutions have occurred

## 2020-01-15 ENCOUNTER — OFFICE VISIT (OUTPATIENT)
Dept: OBGYN CLINIC | Facility: HOSPITAL | Age: 75
End: 2020-01-15
Payer: MEDICARE

## 2020-01-15 VITALS
BODY MASS INDEX: 29.7 KG/M2 | WEIGHT: 219.3 LBS | DIASTOLIC BLOOD PRESSURE: 77 MMHG | HEART RATE: 79 BPM | HEIGHT: 72 IN | SYSTOLIC BLOOD PRESSURE: 161 MMHG

## 2020-01-15 DIAGNOSIS — M70.61 TROCHANTERIC BURSITIS OF RIGHT HIP: Primary | ICD-10-CM

## 2020-01-15 PROCEDURE — 99213 OFFICE O/P EST LOW 20 MIN: CPT | Performed by: ORTHOPAEDIC SURGERY

## 2020-01-15 PROCEDURE — 20610 DRAIN/INJ JOINT/BURSA W/O US: CPT | Performed by: ORTHOPAEDIC SURGERY

## 2020-01-15 RX ORDER — BETAMETHASONE SODIUM PHOSPHATE AND BETAMETHASONE ACETATE 3; 3 MG/ML; MG/ML
12 INJECTION, SUSPENSION INTRA-ARTICULAR; INTRALESIONAL; INTRAMUSCULAR; SOFT TISSUE
Status: COMPLETED | OUTPATIENT
Start: 2020-01-15 | End: 2020-01-15

## 2020-01-15 RX ORDER — LIDOCAINE HYDROCHLORIDE 20 MG/ML
1 INJECTION, SOLUTION INFILTRATION; PERINEURAL
Status: COMPLETED | OUTPATIENT
Start: 2020-01-15 | End: 2020-01-15

## 2020-01-15 RX ORDER — BUPIVACAINE HYDROCHLORIDE 2.5 MG/ML
2 INJECTION, SOLUTION INFILTRATION; PERINEURAL
Status: COMPLETED | OUTPATIENT
Start: 2020-01-15 | End: 2020-01-15

## 2020-01-15 RX ADMIN — LIDOCAINE HYDROCHLORIDE 1 ML: 20 INJECTION, SOLUTION INFILTRATION; PERINEURAL at 14:38

## 2020-01-15 RX ADMIN — BETAMETHASONE SODIUM PHOSPHATE AND BETAMETHASONE ACETATE 12 MG: 3; 3 INJECTION, SUSPENSION INTRA-ARTICULAR; INTRALESIONAL; INTRAMUSCULAR; SOFT TISSUE at 14:38

## 2020-01-15 RX ADMIN — BUPIVACAINE HYDROCHLORIDE 2 ML: 2.5 INJECTION, SOLUTION INFILTRATION; PERINEURAL at 14:38

## 2020-01-15 NOTE — PROGRESS NOTES
76 y o male with history of right trochanteric bursitis presenting for follow-up of right hip pain  At his last visit in October is he received a steroid injection to his right trochanteric bursa experience prong 6 weeks of relief  He has recently had recurrence of his symptoms which includes pain over the right proximal lateral thigh that is made worse with direct contact area and up lying on the affected side, pain is relieved by rest   He describes no groin pain  Review of Systems  Review of systems negative unless otherwise specified in HPI    Past Medical History  Past Medical History:   Diagnosis Date    Hypertension     Thyroid nodule        Past Surgical History  Past Surgical History:   Procedure Laterality Date    KNEE ARTHROSCOPY      [place] KNEE SCOPE,MED/LAT MENISECTOMY Left 9/19/2017    Procedure: KNEE ARTHROSCOPY; PARTIAL MEDIAL MENISCECTOMY;  Surgeon: Yadi Byrnes MD;  [facility];  Service: Orthopedics       Current Medications  Current Outpatient Medications on File Prior to Visit   Medication Sig Dispense Refill    clotrimazole-betamethasone (LOTRISONE) 1-0 05 % cream KATEY 2 GRAMS EXT AA BID  2    ibuprofen (MOTRIN) 600 mg tablet TK 1 T PO Q 8 H PRF PAIN  0    JUBLIA 10 % SOLN KATEY 1 GTT EXT AA QD  8    ramipril (ALTACE) 2 5 mg capsule TAKE 1 CAPSULE DAILY 90 capsule 3     No current facility-administered medications on file prior to visit  Recent Labs [facility])  0   Lab Value Date/Time    HCT 41 2 10/10/2018 0649    HCT 38 1 (L) 08/31/2016 0947    HGB 13 7 10/10/2018 0649    HGB 13 0 (L) 08/31/2016 0947    WBC 6 12 10/10/2018 0649    WBC 6 0 08/31/2016 0947         Physical exam  · General: Awake, Alert, Oriented  · Eyes: Pupils equal, round and reactive to light  · Heart: regular rate and rhythm  · Lungs: No audible wheezing  · Abdomen: soft  Right lower extremity  · Skin intact  · Non tender to palpation over proximal thigh      · Tender over the trochanteric flare  · Nonpainful hip flexion and internal rotation  · 5/5 hip flexor/extensor, abduction, and adduction strength  · [facility] positive  · Sensation intact in s/s/sp/dp/t  · Motor intact fhl/ehl/ta/gsc  · Leg warm and well perfused          Imaging  No new images to review    Procedure:  Large joint arthrocentesis: R greater trochanteric bursa  Date/Time: 1/15/2020 2:38 PM  Consent given by: patient  Site marked: site marked  Timeout: Immediately prior to procedure a time out was called to verify the correct patient, procedure, equipment, support staff and site/side marked as required   Supporting Documentation  Indications: pain   Procedure Details  Location: hip - R greater trochanteric bursa  Preparation: Patient was prepped and draped in the usual sterile fashion  Needle size: 22 G  Ultrasound guidance: no  Approach: lateral  Medications administered: 2 mL bupivacaine 0 25 %; 12 mg betamethasone acetate-betamethasone sodium phosphate 6 (3-3) mg/mL; 1 mL lidocaine 2 %    Patient tolerance: patient tolerated the procedure well with no immediate complications  Dressing:  Sterile dressing applied            Assessment/Plan:   74 y o male with right trochanteric bursitis    · Injection of corticosteroid medication was administered to the right trochanteric bursa as outlined above  · Continue stretching exercises  · Follow-up in 3 months

## 2020-01-15 NOTE — PROGRESS NOTES
Assessment:  No diagnosis found  Plan:      To do next visit:  No follow-ups on file  The above stated was discussed in layman's terms and the patient expressed understanding  All questions were answered to the patient's satisfaction  Scribe Attestation    I,:    am acting as a scribe while in the presence of the attending physician :        I,:    personally performed the services described in this documentation    as scribed in my presence :              Subjective:   Viridiana Carrillo is a 76 y o  male who presents       Review of systems negative unless otherwise specified in HPI    Past Medical History:   Diagnosis Date    Hypertension     Thyroid nodule        Past Surgical History:   Procedure Laterality Date    KNEE ARTHROSCOPY      [place] KNEE SCOPE,MED/LAT MENISECTOMY Left 9/19/2017    Procedure: KNEE ARTHROSCOPY; PARTIAL MEDIAL MENISCECTOMY;  Surgeon: Yang Segovia MD;  [facility];  Service: Orthopedics       History reviewed  No pertinent family history      Social History     Occupational History    Not on file   Tobacco Use    Smoking status: Former Smoker    Smokeless tobacco: Never Used   Substance and Sexual Activity    Alcohol use: No    Drug use: No    Sexual activity: Not on file         Current Outpatient Medications:     clotrimazole-betamethasone (LOTRISONE) 1-0 05 % cream, KATEY 2 GRAMS EXT AA BID, Disp: , Rfl: 2    ibuprofen (MOTRIN) 600 mg tablet, TK 1 T PO Q 8 H PRF PAIN, Disp: , Rfl: 0    JUBLIA 10 % SOLN, KATEY 1 GTT EXT AA QD, Disp: , Rfl: 8    ramipril (ALTACE) 2 5 mg capsule, TAKE 1 CAPSULE DAILY, Disp: 90 capsule, Rfl: 3    No Known Allergies         Vitals:    01/15/20 1343   BP: 161/77   Pulse: 79       Objective:                    Ortho Exam    Diagnostics, reviewed and taken today if performed as documented:    None performed      The attending physician has personally reviewed the pertinent films in PACS and interpretation is as follows:      Procedures, if performed today:    Procedures    None performed      Portions of the record may have been created with voice recognition software  Occasional wrong word or "sound a like" substitutions may have occurred due to the inherent limitations of voice recognition software  Read the chart carefully and recognize, using context, where substitutions have occurred

## 2020-04-18 DIAGNOSIS — I10 ESSENTIAL HYPERTENSION: ICD-10-CM

## 2020-04-20 RX ORDER — RAMIPRIL 2.5 MG/1
CAPSULE ORAL
Qty: 90 CAPSULE | Refills: 3 | Status: SHIPPED | OUTPATIENT
Start: 2020-04-20 | End: 2020-06-09 | Stop reason: SDUPTHER

## 2020-05-27 ENCOUNTER — TELEPHONE (OUTPATIENT)
Dept: INTERNAL MEDICINE CLINIC | Facility: CLINIC | Age: 75
End: 2020-05-27

## 2020-05-27 DIAGNOSIS — E78.00 PURE HYPERCHOLESTEROLEMIA: Primary | ICD-10-CM

## 2020-06-03 ENCOUNTER — LAB (OUTPATIENT)
Dept: LAB | Age: 75
End: 2020-06-03
Payer: MEDICARE

## 2020-06-03 ENCOUNTER — TRANSCRIBE ORDERS (OUTPATIENT)
Dept: ADMINISTRATIVE | Age: 75
End: 2020-06-03

## 2020-06-03 DIAGNOSIS — E78.00 PURE HYPERCHOLESTEROLEMIA: ICD-10-CM

## 2020-06-03 LAB
ALBUMIN SERPL BCP-MCNC: 3.8 G/DL (ref 3.5–5)
ALP SERPL-CCNC: 56 U/L (ref 46–116)
ALT SERPL W P-5'-P-CCNC: 35 U/L (ref 12–78)
ANION GAP SERPL CALCULATED.3IONS-SCNC: 5 MMOL/L (ref 4–13)
AST SERPL W P-5'-P-CCNC: 23 U/L (ref 5–45)
BASOPHILS # BLD AUTO: 0.06 THOUSANDS/ΜL (ref 0–0.1)
BASOPHILS NFR BLD AUTO: 1 % (ref 0–1)
BILIRUB SERPL-MCNC: 0.4 MG/DL (ref 0.2–1)
BUN SERPL-MCNC: 26 MG/DL (ref 5–25)
CALCIUM SERPL-MCNC: 8.9 MG/DL (ref 8.3–10.1)
CHLORIDE SERPL-SCNC: 104 MMOL/L (ref 100–108)
CHOLEST SERPL-MCNC: 232 MG/DL (ref 50–200)
CO2 SERPL-SCNC: 28 MMOL/L (ref 21–32)
CREAT SERPL-MCNC: 1.16 MG/DL (ref 0.6–1.3)
EOSINOPHIL # BLD AUTO: 0.12 THOUSAND/ΜL (ref 0–0.61)
EOSINOPHIL NFR BLD AUTO: 2 % (ref 0–6)
ERYTHROCYTE [DISTWIDTH] IN BLOOD BY AUTOMATED COUNT: 13.2 % (ref 11.6–15.1)
GFR SERPL CREATININE-BSD FRML MDRD: 62 ML/MIN/1.73SQ M
GLUCOSE P FAST SERPL-MCNC: 88 MG/DL (ref 65–99)
HCT VFR BLD AUTO: 40.6 % (ref 36.5–49.3)
HDLC SERPL-MCNC: 55 MG/DL
HGB BLD-MCNC: 13.3 G/DL (ref 12–17)
IMM GRANULOCYTES # BLD AUTO: 0.02 THOUSAND/UL (ref 0–0.2)
IMM GRANULOCYTES NFR BLD AUTO: 0 % (ref 0–2)
LDLC SERPL CALC-MCNC: 161 MG/DL (ref 0–100)
LYMPHOCYTES # BLD AUTO: 1.51 THOUSANDS/ΜL (ref 0.6–4.47)
LYMPHOCYTES NFR BLD AUTO: 24 % (ref 14–44)
MCH RBC QN AUTO: 30.8 PG (ref 26.8–34.3)
MCHC RBC AUTO-ENTMCNC: 32.8 G/DL (ref 31.4–37.4)
MCV RBC AUTO: 94 FL (ref 82–98)
MONOCYTES # BLD AUTO: 0.44 THOUSAND/ΜL (ref 0.17–1.22)
MONOCYTES NFR BLD AUTO: 7 % (ref 4–12)
NEUTROPHILS # BLD AUTO: 4.07 THOUSANDS/ΜL (ref 1.85–7.62)
NEUTS SEG NFR BLD AUTO: 66 % (ref 43–75)
NRBC BLD AUTO-RTO: 0 /100 WBCS
PLATELET # BLD AUTO: 301 THOUSANDS/UL (ref 149–390)
PMV BLD AUTO: 10.2 FL (ref 8.9–12.7)
POTASSIUM SERPL-SCNC: 4.2 MMOL/L (ref 3.5–5.3)
PROT SERPL-MCNC: 7.6 G/DL (ref 6.4–8.2)
RBC # BLD AUTO: 4.32 MILLION/UL (ref 3.88–5.62)
SODIUM SERPL-SCNC: 137 MMOL/L (ref 136–145)
TRIGL SERPL-MCNC: 78 MG/DL
TSH SERPL DL<=0.05 MIU/L-ACNC: 3.04 UIU/ML (ref 0.36–3.74)
WBC # BLD AUTO: 6.22 THOUSAND/UL (ref 4.31–10.16)

## 2020-06-03 PROCEDURE — 85025 COMPLETE CBC W/AUTO DIFF WBC: CPT

## 2020-06-03 PROCEDURE — 84443 ASSAY THYROID STIM HORMONE: CPT

## 2020-06-03 PROCEDURE — 80053 COMPREHEN METABOLIC PANEL: CPT

## 2020-06-03 PROCEDURE — 80061 LIPID PANEL: CPT

## 2020-06-03 PROCEDURE — 36415 COLL VENOUS BLD VENIPUNCTURE: CPT

## 2020-06-05 ENCOUNTER — TELEPHONE (OUTPATIENT)
Dept: INTERNAL MEDICINE CLINIC | Facility: CLINIC | Age: 75
End: 2020-06-05

## 2020-06-09 ENCOUNTER — OFFICE VISIT (OUTPATIENT)
Dept: INTERNAL MEDICINE CLINIC | Facility: CLINIC | Age: 75
End: 2020-06-09
Payer: MEDICARE

## 2020-06-09 VITALS
TEMPERATURE: 98 F | OXYGEN SATURATION: 98 % | WEIGHT: 223.6 LBS | DIASTOLIC BLOOD PRESSURE: 80 MMHG | BODY MASS INDEX: 30.28 KG/M2 | HEART RATE: 83 BPM | HEIGHT: 72 IN | SYSTOLIC BLOOD PRESSURE: 167 MMHG

## 2020-06-09 DIAGNOSIS — I10 ESSENTIAL HYPERTENSION: Primary | ICD-10-CM

## 2020-06-09 DIAGNOSIS — Z13.6 SCREENING FOR AAA (ABDOMINAL AORTIC ANEURYSM): ICD-10-CM

## 2020-06-09 DIAGNOSIS — M79.642 LEFT HAND PAIN: ICD-10-CM

## 2020-06-09 DIAGNOSIS — E78.00 PURE HYPERCHOLESTEROLEMIA: ICD-10-CM

## 2020-06-09 DIAGNOSIS — Z00.00 MEDICARE ANNUAL WELLNESS VISIT, SUBSEQUENT: ICD-10-CM

## 2020-06-09 PROCEDURE — 3079F DIAST BP 80-89 MM HG: CPT | Performed by: INTERNAL MEDICINE

## 2020-06-09 PROCEDURE — 3008F BODY MASS INDEX DOCD: CPT | Performed by: INTERNAL MEDICINE

## 2020-06-09 PROCEDURE — G0439 PPPS, SUBSEQ VISIT: HCPCS | Performed by: INTERNAL MEDICINE

## 2020-06-09 PROCEDURE — 1125F AMNT PAIN NOTED PAIN PRSNT: CPT | Performed by: INTERNAL MEDICINE

## 2020-06-09 PROCEDURE — 93000 ELECTROCARDIOGRAM COMPLETE: CPT | Performed by: INTERNAL MEDICINE

## 2020-06-09 PROCEDURE — 1036F TOBACCO NON-USER: CPT | Performed by: INTERNAL MEDICINE

## 2020-06-09 PROCEDURE — 99214 OFFICE O/P EST MOD 30 MIN: CPT | Performed by: INTERNAL MEDICINE

## 2020-06-09 PROCEDURE — 4040F PNEUMOC VAC/ADMIN/RCVD: CPT | Performed by: INTERNAL MEDICINE

## 2020-06-09 PROCEDURE — 1160F RVW MEDS BY RX/DR IN RCRD: CPT | Performed by: INTERNAL MEDICINE

## 2020-06-09 PROCEDURE — 3077F SYST BP >= 140 MM HG: CPT | Performed by: INTERNAL MEDICINE

## 2020-06-09 PROCEDURE — 1170F FXNL STATUS ASSESSED: CPT | Performed by: INTERNAL MEDICINE

## 2020-06-09 RX ORDER — RAMIPRIL 5 MG/1
5 CAPSULE ORAL DAILY
Qty: 90 CAPSULE | Refills: 3 | Status: SHIPPED | OUTPATIENT
Start: 2020-06-09 | End: 2020-10-31 | Stop reason: HOSPADM

## 2020-06-09 RX ORDER — ATORVASTATIN CALCIUM 10 MG/1
10 TABLET, FILM COATED ORAL DAILY
Qty: 90 TABLET | Refills: 3 | Status: SHIPPED | OUTPATIENT
Start: 2020-06-09 | End: 2020-06-10 | Stop reason: ALTCHOICE

## 2020-06-10 ENCOUNTER — TELEPHONE (OUTPATIENT)
Dept: INTERNAL MEDICINE CLINIC | Facility: CLINIC | Age: 75
End: 2020-06-10

## 2020-06-10 DIAGNOSIS — E78.00 PURE HYPERCHOLESTEROLEMIA: Primary | ICD-10-CM

## 2020-06-10 RX ORDER — PRAVASTATIN SODIUM 10 MG
10 TABLET ORAL
Qty: 90 TABLET | Refills: 3 | Status: SHIPPED | OUTPATIENT
Start: 2020-06-10 | End: 2020-11-16 | Stop reason: SDUPTHER

## 2020-06-15 ENCOUNTER — APPOINTMENT (OUTPATIENT)
Dept: RADIOLOGY | Age: 75
End: 2020-06-15
Payer: MEDICARE

## 2020-06-15 DIAGNOSIS — M79.642 LEFT HAND PAIN: ICD-10-CM

## 2020-06-15 PROCEDURE — 73120 X-RAY EXAM OF HAND: CPT

## 2020-06-16 ENCOUNTER — HOSPITAL ENCOUNTER (OUTPATIENT)
Dept: RADIOLOGY | Age: 75
Discharge: HOME | End: 2020-06-16
Payer: MEDICARE

## 2020-06-16 DIAGNOSIS — Z13.6 SCREENING FOR AAA (ABDOMINAL AORTIC ANEURYSM): ICD-10-CM

## 2020-06-16 PROCEDURE — 76706 US ABDL AORTA SCREEN AAA: CPT

## 2020-06-17 DIAGNOSIS — I77.89 ENLARGED AORTA (HCC): Primary | ICD-10-CM

## 2020-08-25 ENCOUNTER — TELEPHONE (OUTPATIENT)
Dept: INTERNAL MEDICINE CLINIC | Facility: CLINIC | Age: 75
End: 2020-08-25

## 2020-08-25 NOTE — TELEPHONE ENCOUNTER
Patient had an xray done on hand, very little improvement wanted to know if you would recommend a cbd oil for inflammation?     Please Mitul Crowley

## 2020-08-25 NOTE — TELEPHONE ENCOUNTER
Advised pt - he did ask if there was someone he can speak with that would have more of an idea what to recommend or where he can get this?  Please advise

## 2020-08-25 NOTE — TELEPHONE ENCOUNTER
There are 2 local places that can provide lots of information for him and a product that might be best for him  I do not have the expertise in this area    He can try Your [facility] [address] [place] [address] or [address] [place] [address]

## 2020-08-25 NOTE — TELEPHONE ENCOUNTER
Many people of had good results with this, he can try this, I do not have a recommendation of 1 brand over another

## 2020-09-14 ENCOUNTER — TELEPHONE (OUTPATIENT)
Dept: INTERNAL MEDICINE CLINIC | Facility: CLINIC | Age: 75
End: 2020-09-14

## 2020-09-14 DIAGNOSIS — M79.642 LEFT HAND PAIN: Primary | ICD-10-CM

## 2020-09-14 NOTE — TELEPHONE ENCOUNTER
Pt said he had an x-ray of his hand that showed arthritis  He would like a recommendation to see a hand surgeon so he can follow up regarding this  Thank you

## 2020-10-07 ENCOUNTER — OFFICE VISIT (OUTPATIENT)
Dept: OBGYN CLINIC | Facility: CLINIC | Age: 75
End: 2020-10-07
Payer: MEDICARE

## 2020-10-07 VITALS
HEART RATE: 69 BPM | BODY MASS INDEX: 29.53 KG/M2 | HEIGHT: 72 IN | DIASTOLIC BLOOD PRESSURE: 74 MMHG | WEIGHT: 218 LBS | SYSTOLIC BLOOD PRESSURE: 145 MMHG

## 2020-10-07 DIAGNOSIS — M72.0 DUPUYTREN'S CONTRACTURE OF LEFT HAND: Primary | ICD-10-CM

## 2020-10-07 DIAGNOSIS — M79.642 LEFT HAND PAIN: ICD-10-CM

## 2020-10-07 DIAGNOSIS — M77.8 LEFT WRIST TENDONITIS: ICD-10-CM

## 2020-10-07 DIAGNOSIS — M65.332 TRIGGER MIDDLE FINGER OF LEFT HAND: ICD-10-CM

## 2020-10-07 PROCEDURE — 20550 NJX 1 TENDON SHEATH/LIGAMENT: CPT | Performed by: SURGERY

## 2020-10-07 PROCEDURE — 99214 OFFICE O/P EST MOD 30 MIN: CPT | Performed by: SURGERY

## 2020-10-07 RX ORDER — LIDOCAINE HYDROCHLORIDE 10 MG/ML
1 INJECTION, SOLUTION INFILTRATION; PERINEURAL
Status: COMPLETED | OUTPATIENT
Start: 2020-10-07 | End: 2020-10-07

## 2020-10-07 RX ORDER — BETAMETHASONE SODIUM PHOSPHATE AND BETAMETHASONE ACETATE 3; 3 MG/ML; MG/ML
3 INJECTION, SUSPENSION INTRA-ARTICULAR; INTRALESIONAL; INTRAMUSCULAR; SOFT TISSUE
Status: COMPLETED | OUTPATIENT
Start: 2020-10-07 | End: 2020-10-07

## 2020-10-07 RX ORDER — MELOXICAM 15 MG/1
15 TABLET ORAL DAILY
Qty: 30 TABLET | Refills: 0 | Status: SHIPPED | OUTPATIENT
Start: 2020-10-07 | End: 2020-10-31 | Stop reason: HOSPADM

## 2020-10-07 RX ORDER — METHYLPREDNISOLONE 4 MG/1
TABLET ORAL
Qty: 1 EACH | Refills: 1 | Status: ON HOLD | OUTPATIENT
Start: 2020-10-07 | End: 2020-10-30

## 2020-10-07 RX ADMIN — BETAMETHASONE SODIUM PHOSPHATE AND BETAMETHASONE ACETATE 3 MG: 3; 3 INJECTION, SUSPENSION INTRA-ARTICULAR; INTRALESIONAL; INTRAMUSCULAR; SOFT TISSUE at 12:51

## 2020-10-07 RX ADMIN — LIDOCAINE HYDROCHLORIDE 1 ML: 10 INJECTION, SOLUTION INFILTRATION; PERINEURAL at 12:51

## 2020-10-30 ENCOUNTER — APPOINTMENT (EMERGENCY)
Dept: CT IMAGING | Facility: HOSPITAL | Age: 75
End: 2020-10-30
Payer: MEDICARE

## 2020-10-30 ENCOUNTER — HOSPITAL ENCOUNTER (OUTPATIENT)
Facility: HOSPITAL | Age: 75
Setting detail: OBSERVATION
Discharge: HOME | End: 2020-10-31
Attending: EMERGENCY MEDICINE | Admitting: INTERNAL MEDICINE
Payer: MEDICARE

## 2020-10-30 DIAGNOSIS — I10 ESSENTIAL HYPERTENSION: ICD-10-CM

## 2020-10-30 DIAGNOSIS — R55 NEAR SYNCOPE: Primary | ICD-10-CM

## 2020-10-30 DIAGNOSIS — R42 LIGHTHEADEDNESS: ICD-10-CM

## 2020-10-30 PROBLEM — N17.9 AKI (ACUTE KIDNEY INJURY) (HCC): Status: ACTIVE | Noted: 2020-10-30

## 2020-10-30 PROBLEM — I71.4 AAA (ABDOMINAL AORTIC ANEURYSM) (HCC): Status: ACTIVE | Noted: 2020-10-30

## 2020-10-30 PROBLEM — I71.40 AAA (ABDOMINAL AORTIC ANEURYSM): Status: ACTIVE | Noted: 2020-10-30

## 2020-10-30 PROBLEM — R79.89 ELEVATED SERUM CREATININE: Status: ACTIVE | Noted: 2020-10-30

## 2020-10-30 LAB
ALBUMIN SERPL BCP-MCNC: 3.8 G/DL (ref 3.5–5)
ALP SERPL-CCNC: 62 U/L (ref 46–116)
ALT SERPL W P-5'-P-CCNC: 38 U/L (ref 12–78)
ANION GAP SERPL CALCULATED.3IONS-SCNC: 11 MMOL/L (ref 4–13)
AST SERPL W P-5'-P-CCNC: 22 U/L (ref 5–45)
BASOPHILS # BLD AUTO: 0.05 THOUSANDS/ΜL (ref 0–0.1)
BASOPHILS NFR BLD AUTO: 1 % (ref 0–1)
BILIRUB SERPL-MCNC: 0.42 MG/DL (ref 0.2–1)
BUN SERPL-MCNC: 30 MG/DL (ref 5–25)
CALCIUM SERPL-MCNC: 8.9 MG/DL (ref 8.3–10.1)
CHLORIDE SERPL-SCNC: 102 MMOL/L (ref 100–108)
CO2 SERPL-SCNC: 25 MMOL/L (ref 21–32)
CREAT SERPL-MCNC: 1.47 MG/DL (ref 0.6–1.3)
EOSINOPHIL # BLD AUTO: 0.19 THOUSAND/ΜL (ref 0–0.61)
EOSINOPHIL NFR BLD AUTO: 3 % (ref 0–6)
ERYTHROCYTE [DISTWIDTH] IN BLOOD BY AUTOMATED COUNT: 13.4 % (ref 11.6–15.1)
GFR SERPL CREATININE-BSD FRML MDRD: 46 ML/MIN/1.73SQ M
GLUCOSE SERPL-MCNC: 114 MG/DL (ref 65–140)
HCT VFR BLD AUTO: 38.5 % (ref 36.5–49.3)
HGB BLD-MCNC: 13 G/DL (ref 12–17)
IMM GRANULOCYTES # BLD AUTO: 0.02 THOUSAND/UL (ref 0–0.2)
IMM GRANULOCYTES NFR BLD AUTO: 0 % (ref 0–2)
LYMPHOCYTES # BLD AUTO: 2.5 THOUSANDS/ΜL (ref 0.6–4.47)
LYMPHOCYTES NFR BLD AUTO: 33 % (ref 14–44)
MAGNESIUM SERPL-MCNC: 2.3 MG/DL (ref 1.6–2.6)
MCH RBC QN AUTO: 31.9 PG (ref 26.8–34.3)
MCHC RBC AUTO-ENTMCNC: 33.8 G/DL (ref 31.4–37.4)
MCV RBC AUTO: 94 FL (ref 82–98)
MONOCYTES # BLD AUTO: 0.46 THOUSAND/ΜL (ref 0.17–1.22)
MONOCYTES NFR BLD AUTO: 6 % (ref 4–12)
NEUTROPHILS # BLD AUTO: 4.4 THOUSANDS/ΜL (ref 1.85–7.62)
NEUTS SEG NFR BLD AUTO: 57 % (ref 43–75)
NRBC BLD AUTO-RTO: 0 /100 WBCS
PLATELET # BLD AUTO: 270 THOUSANDS/UL (ref 149–390)
PMV BLD AUTO: 10.3 FL (ref 8.9–12.7)
POTASSIUM SERPL-SCNC: 3.8 MMOL/L (ref 3.5–5.3)
PROT SERPL-MCNC: 7.5 G/DL (ref 6.4–8.2)
RBC # BLD AUTO: 4.08 MILLION/UL (ref 3.88–5.62)
SODIUM SERPL-SCNC: 138 MMOL/L (ref 136–145)
TROPONIN I SERPL-MCNC: <0.02 NG/ML
TROPONIN I SERPL-MCNC: <0.02 NG/ML
WBC # BLD AUTO: 7.62 THOUSAND/UL (ref 4.31–10.16)

## 2020-10-30 PROCEDURE — 99285 EMERGENCY DEPT VISIT HI MDM: CPT | Performed by: PHYSICIAN ASSISTANT

## 2020-10-30 PROCEDURE — 99285 EMERGENCY DEPT VISIT HI MDM: CPT

## 2020-10-30 PROCEDURE — 93005 ELECTROCARDIOGRAM TRACING: CPT

## 2020-10-30 PROCEDURE — 85025 COMPLETE CBC W/AUTO DIFF WBC: CPT | Performed by: PHYSICIAN ASSISTANT

## 2020-10-30 PROCEDURE — 84484 ASSAY OF TROPONIN QUANT: CPT | Performed by: PHYSICIAN ASSISTANT

## 2020-10-30 PROCEDURE — 1124F ACP DISCUSS-NO DSCNMKR DOCD: CPT | Performed by: PHYSICIAN ASSISTANT

## 2020-10-30 PROCEDURE — 70450 CT HEAD/BRAIN W/O DYE: CPT

## 2020-10-30 PROCEDURE — 83735 ASSAY OF MAGNESIUM: CPT | Performed by: NURSE PRACTITIONER

## 2020-10-30 PROCEDURE — G1004 CDSM NDSC: HCPCS

## 2020-10-30 PROCEDURE — 96360 HYDRATION IV INFUSION INIT: CPT

## 2020-10-30 PROCEDURE — 80053 COMPREHEN METABOLIC PANEL: CPT | Performed by: PHYSICIAN ASSISTANT

## 2020-10-30 PROCEDURE — 84484 ASSAY OF TROPONIN QUANT: CPT | Performed by: NURSE PRACTITIONER

## 2020-10-30 PROCEDURE — 83036 HEMOGLOBIN GLYCOSYLATED A1C: CPT | Performed by: NURSE PRACTITIONER

## 2020-10-30 PROCEDURE — 99219 PR INITIAL OBSERVATION CARE/DAY 50 MINUTES: CPT | Performed by: NURSE PRACTITIONER

## 2020-10-30 PROCEDURE — 36415 COLL VENOUS BLD VENIPUNCTURE: CPT | Performed by: PHYSICIAN ASSISTANT

## 2020-10-30 RX ORDER — LISINOPRIL 5 MG/1
5 TABLET ORAL ONCE
Status: COMPLETED | OUTPATIENT
Start: 2020-10-30 | End: 2020-10-30

## 2020-10-30 RX ORDER — HYDRALAZINE HYDROCHLORIDE 20 MG/ML
5 INJECTION INTRAMUSCULAR; INTRAVENOUS EVERY 8 HOURS PRN
Status: DISCONTINUED | OUTPATIENT
Start: 2020-10-30 | End: 2020-10-31 | Stop reason: HOSPADM

## 2020-10-30 RX ORDER — LISINOPRIL 20 MG/1
20 TABLET ORAL
Status: DISCONTINUED | OUTPATIENT
Start: 2020-10-30 | End: 2020-10-30

## 2020-10-30 RX ORDER — MECLIZINE HCL 12.5 MG/1
12.5 TABLET ORAL EVERY 8 HOURS PRN
Status: DISCONTINUED | OUTPATIENT
Start: 2020-10-30 | End: 2020-10-31 | Stop reason: HOSPADM

## 2020-10-30 RX ORDER — SODIUM CHLORIDE, SODIUM LACTATE, POTASSIUM CHLORIDE, CALCIUM CHLORIDE 600; 310; 30; 20 MG/100ML; MG/100ML; MG/100ML; MG/100ML
75 INJECTION, SOLUTION INTRAVENOUS ONCE
Status: COMPLETED | OUTPATIENT
Start: 2020-10-30 | End: 2020-10-31

## 2020-10-30 RX ORDER — CLOTRIMAZOLE AND BETAMETHASONE DIPROPIONATE 10; .64 MG/G; MG/G
CREAM TOPICAL 2 TIMES DAILY
Status: DISCONTINUED | OUTPATIENT
Start: 2020-10-30 | End: 2020-10-31 | Stop reason: HOSPADM

## 2020-10-30 RX ORDER — LISINOPRIL 20 MG/1
20 TABLET ORAL
Status: DISCONTINUED | OUTPATIENT
Start: 2020-10-31 | End: 2020-10-31 | Stop reason: HOSPADM

## 2020-10-30 RX ORDER — PRAVASTATIN SODIUM 10 MG
10 TABLET ORAL
Status: DISCONTINUED | OUTPATIENT
Start: 2020-10-31 | End: 2020-10-31 | Stop reason: HOSPADM

## 2020-10-30 RX ORDER — HEPARIN SODIUM 5000 [USP'U]/ML
5000 INJECTION, SOLUTION INTRAVENOUS; SUBCUTANEOUS EVERY 8 HOURS SCHEDULED
Status: DISCONTINUED | OUTPATIENT
Start: 2020-10-30 | End: 2020-10-31 | Stop reason: HOSPADM

## 2020-10-30 RX ADMIN — LISINOPRIL 5 MG: 5 TABLET ORAL at 21:52

## 2020-10-30 RX ADMIN — SODIUM CHLORIDE 1000 ML: 0.9 INJECTION, SOLUTION INTRAVENOUS at 20:09

## 2020-10-31 VITALS
OXYGEN SATURATION: 95 % | TEMPERATURE: 97.8 F | SYSTOLIC BLOOD PRESSURE: 160 MMHG | HEIGHT: 73 IN | HEART RATE: 72 BPM | BODY MASS INDEX: 28.65 KG/M2 | DIASTOLIC BLOOD PRESSURE: 70 MMHG | RESPIRATION RATE: 18 BRPM | WEIGHT: 216.2 LBS

## 2020-10-31 LAB
ANION GAP SERPL CALCULATED.3IONS-SCNC: 12 MMOL/L (ref 4–13)
BASOPHILS # BLD AUTO: 0.05 THOUSANDS/ΜL (ref 0–0.1)
BASOPHILS NFR BLD AUTO: 1 % (ref 0–1)
BUN SERPL-MCNC: 28 MG/DL (ref 5–25)
CALCIUM SERPL-MCNC: 8.5 MG/DL (ref 8.3–10.1)
CHLORIDE SERPL-SCNC: 102 MMOL/L (ref 100–108)
CHOLEST SERPL-MCNC: 173 MG/DL (ref 50–200)
CO2 SERPL-SCNC: 23 MMOL/L (ref 21–32)
CREAT SERPL-MCNC: 1.35 MG/DL (ref 0.6–1.3)
EOSINOPHIL # BLD AUTO: 0.15 THOUSAND/ΜL (ref 0–0.61)
EOSINOPHIL NFR BLD AUTO: 2 % (ref 0–6)
ERYTHROCYTE [DISTWIDTH] IN BLOOD BY AUTOMATED COUNT: 13.4 % (ref 11.6–15.1)
EST. AVERAGE GLUCOSE BLD GHB EST-MCNC: 105 MG/DL
GFR SERPL CREATININE-BSD FRML MDRD: 51 ML/MIN/1.73SQ M
GLUCOSE SERPL-MCNC: 83 MG/DL (ref 65–140)
GLUCOSE SERPL-MCNC: 93 MG/DL (ref 65–140)
GLUCOSE SERPL-MCNC: 98 MG/DL (ref 65–140)
HBA1C MFR BLD: 5.3 %
HCT VFR BLD AUTO: 36.8 % (ref 36.5–49.3)
HDLC SERPL-MCNC: 60 MG/DL
HGB BLD-MCNC: 12.1 G/DL (ref 12–17)
IMM GRANULOCYTES # BLD AUTO: 0 THOUSAND/UL (ref 0–0.2)
IMM GRANULOCYTES NFR BLD AUTO: 0 % (ref 0–2)
LDLC SERPL CALC-MCNC: 103 MG/DL (ref 0–100)
LYMPHOCYTES # BLD AUTO: 2.38 THOUSANDS/ΜL (ref 0.6–4.47)
LYMPHOCYTES NFR BLD AUTO: 31 % (ref 14–44)
MCH RBC QN AUTO: 31.3 PG (ref 26.8–34.3)
MCHC RBC AUTO-ENTMCNC: 32.9 G/DL (ref 31.4–37.4)
MCV RBC AUTO: 95 FL (ref 82–98)
MONOCYTES # BLD AUTO: 0.56 THOUSAND/ΜL (ref 0.17–1.22)
MONOCYTES NFR BLD AUTO: 7 % (ref 4–12)
NEUTROPHILS # BLD AUTO: 4.57 THOUSANDS/ΜL (ref 1.85–7.62)
NEUTS SEG NFR BLD AUTO: 59 % (ref 43–75)
NRBC BLD AUTO-RTO: 0 /100 WBCS
PLATELET # BLD AUTO: 242 THOUSANDS/UL (ref 149–390)
PMV BLD AUTO: 9.9 FL (ref 8.9–12.7)
POTASSIUM SERPL-SCNC: 3.7 MMOL/L (ref 3.5–5.3)
RBC # BLD AUTO: 3.87 MILLION/UL (ref 3.88–5.62)
SODIUM SERPL-SCNC: 137 MMOL/L (ref 136–145)
TRIGL SERPL-MCNC: 52 MG/DL
TROPONIN I SERPL-MCNC: <0.02 NG/ML
WBC # BLD AUTO: 7.71 THOUSAND/UL (ref 4.31–10.16)

## 2020-10-31 PROCEDURE — 99217 PR OBSERVATION CARE DISCHARGE MANAGEMENT: CPT | Performed by: INTERNAL MEDICINE

## 2020-10-31 PROCEDURE — 85025 COMPLETE CBC W/AUTO DIFF WBC: CPT | Performed by: INTERNAL MEDICINE

## 2020-10-31 PROCEDURE — 93005 ELECTROCARDIOGRAM TRACING: CPT

## 2020-10-31 PROCEDURE — 82948 REAGENT STRIP/BLOOD GLUCOSE: CPT

## 2020-10-31 PROCEDURE — 84484 ASSAY OF TROPONIN QUANT: CPT | Performed by: NURSE PRACTITIONER

## 2020-10-31 PROCEDURE — 80048 BASIC METABOLIC PNL TOTAL CA: CPT | Performed by: INTERNAL MEDICINE

## 2020-10-31 PROCEDURE — 80061 LIPID PANEL: CPT | Performed by: INTERNAL MEDICINE

## 2020-10-31 RX ORDER — MECLIZINE HCL 12.5 MG/1
12.5 TABLET ORAL EVERY 8 HOURS PRN
Qty: 9 TABLET | Refills: 0 | Status: SHIPPED | OUTPATIENT
Start: 2020-10-31 | End: 2020-11-19

## 2020-10-31 RX ORDER — AMLODIPINE BESYLATE 5 MG/1
5 TABLET ORAL DAILY
Qty: 30 TABLET | Refills: 0 | Status: SHIPPED | OUTPATIENT
Start: 2020-10-31 | End: 2020-11-02 | Stop reason: SDUPTHER

## 2020-10-31 RX ADMIN — LISINOPRIL 15 MG: 10 TABLET ORAL at 00:07

## 2020-10-31 RX ADMIN — HEPARIN SODIUM 5000 UNITS: 5000 INJECTION INTRAVENOUS; SUBCUTANEOUS at 05:40

## 2020-10-31 RX ADMIN — SODIUM CHLORIDE, SODIUM LACTATE, POTASSIUM CHLORIDE, AND CALCIUM CHLORIDE 75 ML/HR: .6; .31; .03; .02 INJECTION, SOLUTION INTRAVENOUS at 00:08

## 2020-10-31 RX ADMIN — CLOTRIMAZOLE AND BETAMETHASONE DIPROPIONATE: 10; .64 CREAM TOPICAL at 09:43

## 2020-10-31 RX ADMIN — HEPARIN SODIUM 5000 UNITS: 5000 INJECTION INTRAVENOUS; SUBCUTANEOUS at 00:07

## 2020-11-01 LAB
ATRIAL RATE: 66 BPM
ATRIAL RATE: 74 BPM
P AXIS: 65 DEGREES
P AXIS: 73 DEGREES
PR INTERVAL: 158 MS
PR INTERVAL: 162 MS
QRS AXIS: -31 DEGREES
QRS AXIS: 9 DEGREES
QRSD INTERVAL: 88 MS
QRSD INTERVAL: 94 MS
QT INTERVAL: 374 MS
QT INTERVAL: 424 MS
QTC INTERVAL: 415 MS
QTC INTERVAL: 444 MS
T WAVE AXIS: 41 DEGREES
T WAVE AXIS: 54 DEGREES
VENTRICULAR RATE: 66 BPM
VENTRICULAR RATE: 74 BPM

## 2020-11-01 PROCEDURE — 93010 ELECTROCARDIOGRAM REPORT: CPT | Performed by: INTERNAL MEDICINE

## 2020-11-02 ENCOUNTER — OFFICE VISIT (OUTPATIENT)
Dept: INTERNAL MEDICINE CLINIC | Facility: CLINIC | Age: 75
End: 2020-11-02
Payer: MEDICARE

## 2020-11-02 ENCOUNTER — TRANSITIONAL CARE MANAGEMENT (OUTPATIENT)
Dept: INTERNAL MEDICINE CLINIC | Facility: CLINIC | Age: 75
End: 2020-11-02

## 2020-11-02 ENCOUNTER — TELEPHONE (OUTPATIENT)
Dept: INTERNAL MEDICINE CLINIC | Facility: CLINIC | Age: 75
End: 2020-11-02

## 2020-11-02 VITALS
HEART RATE: 78 BPM | DIASTOLIC BLOOD PRESSURE: 68 MMHG | RESPIRATION RATE: 22 BRPM | WEIGHT: 219.2 LBS | TEMPERATURE: 97.6 F | SYSTOLIC BLOOD PRESSURE: 162 MMHG | HEIGHT: 73 IN | OXYGEN SATURATION: 97 % | BODY MASS INDEX: 29.05 KG/M2

## 2020-11-02 DIAGNOSIS — N18.30 STAGE 3 CHRONIC KIDNEY DISEASE, UNSPECIFIED WHETHER STAGE 3A OR 3B CKD (HCC): ICD-10-CM

## 2020-11-02 DIAGNOSIS — I10 ESSENTIAL HYPERTENSION: ICD-10-CM

## 2020-11-02 DIAGNOSIS — E78.00 PURE HYPERCHOLESTEROLEMIA: ICD-10-CM

## 2020-11-02 DIAGNOSIS — R55 NEAR SYNCOPE: Primary | ICD-10-CM

## 2020-11-02 PROCEDURE — 99495 TRANSJ CARE MGMT MOD F2F 14D: CPT | Performed by: INTERNAL MEDICINE

## 2020-11-02 RX ORDER — AMLODIPINE BESYLATE 10 MG/1
10 TABLET ORAL DAILY
Qty: 90 TABLET | Refills: 3 | Status: SHIPPED | OUTPATIENT
Start: 2020-11-02 | End: 2021-07-12 | Stop reason: SDUPTHER

## 2020-11-02 RX ORDER — FLUOROURACIL 50 MG/G
CREAM TOPICAL
COMMUNITY
Start: 2020-10-21

## 2020-11-03 ENCOUNTER — LAB (OUTPATIENT)
Dept: LAB | Age: 75
End: 2020-11-03
Payer: MEDICARE

## 2020-11-03 DIAGNOSIS — R55 NEAR SYNCOPE: ICD-10-CM

## 2020-11-03 LAB
ANION GAP SERPL CALCULATED.3IONS-SCNC: 4 MMOL/L (ref 4–13)
BUN SERPL-MCNC: 26 MG/DL (ref 5–25)
CALCIUM SERPL-MCNC: 9.4 MG/DL (ref 8.3–10.1)
CHLORIDE SERPL-SCNC: 105 MMOL/L (ref 100–108)
CO2 SERPL-SCNC: 29 MMOL/L (ref 21–32)
CREAT SERPL-MCNC: 1.3 MG/DL (ref 0.6–1.3)
GFR SERPL CREATININE-BSD FRML MDRD: 54 ML/MIN/1.73SQ M
GLUCOSE P FAST SERPL-MCNC: 94 MG/DL (ref 65–99)
POTASSIUM SERPL-SCNC: 4.3 MMOL/L (ref 3.5–5.3)
SODIUM SERPL-SCNC: 138 MMOL/L (ref 136–145)

## 2020-11-03 PROCEDURE — 80048 BASIC METABOLIC PNL TOTAL CA: CPT

## 2020-11-03 PROCEDURE — 36415 COLL VENOUS BLD VENIPUNCTURE: CPT

## 2020-11-09 ENCOUNTER — HOSPITAL ENCOUNTER (OUTPATIENT)
Dept: NON INVASIVE DIAGNOSTICS | Facility: CLINIC | Age: 75
Discharge: HOME | End: 2020-11-09
Payer: MEDICARE

## 2020-11-09 DIAGNOSIS — R55 NEAR SYNCOPE: ICD-10-CM

## 2020-11-09 PROCEDURE — 93225 XTRNL ECG REC<48 HRS REC: CPT

## 2020-11-09 PROCEDURE — 93226 XTRNL ECG REC<48 HR SCAN A/R: CPT

## 2020-11-13 PROCEDURE — 93227 XTRNL ECG REC<48 HR R&I: CPT | Performed by: INTERNAL MEDICINE

## 2020-11-16 ENCOUNTER — TELEPHONE (OUTPATIENT)
Dept: INTERNAL MEDICINE CLINIC | Facility: CLINIC | Age: 75
End: 2020-11-16

## 2020-11-16 DIAGNOSIS — I10 ESSENTIAL HYPERTENSION: ICD-10-CM

## 2020-11-16 DIAGNOSIS — E78.00 PURE HYPERCHOLESTEROLEMIA: ICD-10-CM

## 2020-11-16 DIAGNOSIS — I10 ESSENTIAL HYPERTENSION: Primary | ICD-10-CM

## 2020-11-16 RX ORDER — PRAVASTATIN SODIUM 10 MG
10 TABLET ORAL
Qty: 90 TABLET | Refills: 3 | Status: SHIPPED | OUTPATIENT
Start: 2020-11-16 | End: 2021-12-08

## 2020-11-16 RX ORDER — METOPROLOL SUCCINATE 25 MG/1
25 TABLET, EXTENDED RELEASE ORAL DAILY
Qty: 90 TABLET | Refills: 3 | Status: SHIPPED | OUTPATIENT
Start: 2020-11-16 | End: 2021-07-12 | Stop reason: SDUPTHER

## 2020-11-16 RX ORDER — PRAVASTATIN SODIUM 10 MG
10 TABLET ORAL
Qty: 90 TABLET | Refills: 3 | Status: SHIPPED | OUTPATIENT
Start: 2020-11-16 | End: 2020-11-16 | Stop reason: SDUPTHER

## 2020-11-16 RX ORDER — METOPROLOL SUCCINATE 25 MG/1
25 TABLET, EXTENDED RELEASE ORAL DAILY
Qty: 90 TABLET | Refills: 3 | Status: SHIPPED | OUTPATIENT
Start: 2020-11-16 | End: 2020-11-16 | Stop reason: SDUPTHER

## 2020-11-18 ENCOUNTER — OFFICE VISIT (OUTPATIENT)
Dept: OBGYN CLINIC | Facility: CLINIC | Age: 75
End: 2020-11-18
Payer: MEDICARE

## 2020-11-18 VITALS — DIASTOLIC BLOOD PRESSURE: 77 MMHG | HEART RATE: 69 BPM | TEMPERATURE: 96.6 F | SYSTOLIC BLOOD PRESSURE: 167 MMHG

## 2020-11-18 DIAGNOSIS — M72.0 DUPUYTREN'S CONTRACTURE OF LEFT HAND: Primary | ICD-10-CM

## 2020-11-18 DIAGNOSIS — M65.332 TRIGGER MIDDLE FINGER OF LEFT HAND: ICD-10-CM

## 2020-11-18 PROCEDURE — 99214 OFFICE O/P EST MOD 30 MIN: CPT | Performed by: SURGERY

## 2020-11-19 ENCOUNTER — ANESTHESIA EVENT (OUTPATIENT)
Dept: PERIOP | Facility: HOSPITAL | Age: 75
End: 2020-11-19
Payer: MEDICARE

## 2020-11-30 ENCOUNTER — ANESTHESIA (OUTPATIENT)
Dept: PERIOP | Facility: HOSPITAL | Age: 75
End: 2020-11-30
Payer: MEDICARE

## 2020-11-30 ENCOUNTER — HOSPITAL ENCOUNTER (OUTPATIENT)
Facility: HOSPITAL | Age: 75
Setting detail: OUTPATIENT SURGERY
Discharge: HOME | End: 2020-11-30
Attending: SURGERY | Admitting: SURGERY
Payer: MEDICARE

## 2020-11-30 VITALS
HEIGHT: 73 IN | WEIGHT: 219 LBS | BODY MASS INDEX: 29.03 KG/M2 | OXYGEN SATURATION: 97 % | TEMPERATURE: 97.4 F | RESPIRATION RATE: 18 BRPM | DIASTOLIC BLOOD PRESSURE: 77 MMHG | HEART RATE: 71 BPM | SYSTOLIC BLOOD PRESSURE: 157 MMHG

## 2020-11-30 VITALS — HEART RATE: 66 BPM

## 2020-11-30 DIAGNOSIS — Z47.89 AFTERCARE FOLLOWING SURGERY OF THE MUSCULOSKELETAL SYSTEM: ICD-10-CM

## 2020-11-30 DIAGNOSIS — M65.332 TRIGGER MIDDLE FINGER OF LEFT HAND: Primary | ICD-10-CM

## 2020-11-30 PROCEDURE — 88304 TISSUE EXAM BY PATHOLOGIST: CPT | Performed by: PATHOLOGY

## 2020-11-30 PROCEDURE — 26160 REMOVE TENDON SHEATH LESION: CPT | Performed by: PHYSICIAN ASSISTANT

## 2020-11-30 PROCEDURE — 26160 REMOVE TENDON SHEATH LESION: CPT | Performed by: SURGERY

## 2020-11-30 RX ORDER — PROPOFOL 10 MG/ML
INJECTION, EMULSION INTRAVENOUS CONTINUOUS PRN
Status: DISCONTINUED | OUTPATIENT
Start: 2020-11-30 | End: 2020-11-30

## 2020-11-30 RX ORDER — MAGNESIUM HYDROXIDE 1200 MG/15ML
LIQUID ORAL AS NEEDED
Status: DISCONTINUED | OUTPATIENT
Start: 2020-11-30 | End: 2020-11-30 | Stop reason: HOSPADM

## 2020-11-30 RX ORDER — GLYCOPYRROLATE 0.2 MG/ML
INJECTION INTRAMUSCULAR; INTRAVENOUS AS NEEDED
Status: DISCONTINUED | OUTPATIENT
Start: 2020-11-30 | End: 2020-11-30

## 2020-11-30 RX ORDER — FENTANYL CITRATE/PF 50 MCG/ML
50 SYRINGE (ML) INJECTION
Status: DISCONTINUED | OUTPATIENT
Start: 2020-11-30 | End: 2020-11-30 | Stop reason: HOSPADM

## 2020-11-30 RX ORDER — CEFAZOLIN SODIUM 2 G/50ML
2000 SOLUTION INTRAVENOUS ONCE
Status: COMPLETED | OUTPATIENT
Start: 2020-11-30 | End: 2020-11-30

## 2020-11-30 RX ORDER — HYDROCODONE BITARTRATE AND ACETAMINOPHEN 5; 325 MG/1; MG/1
1 TABLET ORAL EVERY 6 HOURS PRN
Status: DISCONTINUED | OUTPATIENT
Start: 2020-11-30 | End: 2020-11-30 | Stop reason: HOSPADM

## 2020-11-30 RX ORDER — HYDROCODONE BITARTRATE AND ACETAMINOPHEN 5; 325 MG/1; MG/1
1 TABLET ORAL EVERY 6 HOURS PRN
Qty: 5 TABLET | Refills: 0 | Status: SHIPPED | OUTPATIENT
Start: 2020-11-30 | End: 2020-12-08 | Stop reason: ALTCHOICE

## 2020-11-30 RX ORDER — MIDAZOLAM HYDROCHLORIDE 2 MG/2ML
INJECTION, SOLUTION INTRAMUSCULAR; INTRAVENOUS AS NEEDED
Status: DISCONTINUED | OUTPATIENT
Start: 2020-11-30 | End: 2020-11-30

## 2020-11-30 RX ORDER — ONDANSETRON 2 MG/ML
4 INJECTION INTRAMUSCULAR; INTRAVENOUS ONCE AS NEEDED
Status: DISCONTINUED | OUTPATIENT
Start: 2020-11-30 | End: 2020-11-30 | Stop reason: HOSPADM

## 2020-11-30 RX ORDER — HYDROMORPHONE HCL/PF 1 MG/ML
0.5 SYRINGE (ML) INJECTION
Status: DISCONTINUED | OUTPATIENT
Start: 2020-11-30 | End: 2020-11-30 | Stop reason: HOSPADM

## 2020-11-30 RX ORDER — SODIUM CHLORIDE, SODIUM LACTATE, POTASSIUM CHLORIDE, CALCIUM CHLORIDE 600; 310; 30; 20 MG/100ML; MG/100ML; MG/100ML; MG/100ML
INJECTION, SOLUTION INTRAVENOUS CONTINUOUS PRN
Status: DISCONTINUED | OUTPATIENT
Start: 2020-11-30 | End: 2020-11-30

## 2020-11-30 RX ORDER — FENTANYL CITRATE 50 UG/ML
INJECTION, SOLUTION INTRAMUSCULAR; INTRAVENOUS AS NEEDED
Status: DISCONTINUED | OUTPATIENT
Start: 2020-11-30 | End: 2020-11-30

## 2020-11-30 RX ORDER — ONDANSETRON 2 MG/ML
INJECTION INTRAMUSCULAR; INTRAVENOUS AS NEEDED
Status: DISCONTINUED | OUTPATIENT
Start: 2020-11-30 | End: 2020-11-30

## 2020-11-30 RX ORDER — PROPOFOL 10 MG/ML
INJECTION, EMULSION INTRAVENOUS AS NEEDED
Status: DISCONTINUED | OUTPATIENT
Start: 2020-11-30 | End: 2020-11-30

## 2020-11-30 RX ADMIN — FENTANYL CITRATE 25 MCG: 50 INJECTION INTRAMUSCULAR; INTRAVENOUS at 08:30

## 2020-11-30 RX ADMIN — MIDAZOLAM HYDROCHLORIDE 1 MG: 1 INJECTION, SOLUTION INTRAMUSCULAR; INTRAVENOUS at 08:25

## 2020-11-30 RX ADMIN — SODIUM CHLORIDE, SODIUM LACTATE, POTASSIUM CHLORIDE, AND CALCIUM CHLORIDE: .6; .31; .03; .02 INJECTION, SOLUTION INTRAVENOUS at 08:25

## 2020-11-30 RX ADMIN — CEFAZOLIN SODIUM 2000 MG: 2 SOLUTION INTRAVENOUS at 08:20

## 2020-11-30 RX ADMIN — ONDANSETRON 4 MG: 2 INJECTION INTRAMUSCULAR; INTRAVENOUS at 08:25

## 2020-11-30 RX ADMIN — PROPOFOL 50 MCG/KG/MIN: 10 INJECTION, EMULSION INTRAVENOUS at 08:30

## 2020-11-30 RX ADMIN — GLYCOPYRROLATE 0.2 MG: 0.2 INJECTION, SOLUTION INTRAMUSCULAR; INTRAVENOUS at 08:25

## 2020-11-30 RX ADMIN — PROPOFOL 50 MG: 10 INJECTION, EMULSION INTRAVENOUS at 08:30

## 2020-11-30 RX ADMIN — PROPOFOL 20 MG: 10 INJECTION, EMULSION INTRAVENOUS at 08:41

## 2020-12-02 ENCOUNTER — EVALUATION (OUTPATIENT)
Dept: OCCUPATIONAL THERAPY | Age: 75
End: 2020-12-02
Payer: MEDICARE

## 2020-12-02 DIAGNOSIS — M65.332 TRIGGER MIDDLE FINGER OF LEFT HAND: ICD-10-CM

## 2020-12-02 PROCEDURE — 97165 OT EVAL LOW COMPLEX 30 MIN: CPT

## 2020-12-02 PROCEDURE — 97140 MANUAL THERAPY 1/> REGIONS: CPT

## 2020-12-02 PROCEDURE — 97110 THERAPEUTIC EXERCISES: CPT

## 2020-12-08 ENCOUNTER — OFFICE VISIT (OUTPATIENT)
Dept: OCCUPATIONAL THERAPY | Age: 75
End: 2020-12-08
Payer: MEDICARE

## 2020-12-08 ENCOUNTER — OFFICE VISIT (OUTPATIENT)
Dept: INTERNAL MEDICINE CLINIC | Facility: CLINIC | Age: 75
End: 2020-12-08
Payer: MEDICARE

## 2020-12-08 VITALS
BODY MASS INDEX: 28.79 KG/M2 | OXYGEN SATURATION: 98 % | WEIGHT: 217.2 LBS | TEMPERATURE: 97.3 F | HEART RATE: 72 BPM | SYSTOLIC BLOOD PRESSURE: 138 MMHG | DIASTOLIC BLOOD PRESSURE: 78 MMHG | HEIGHT: 73 IN

## 2020-12-08 DIAGNOSIS — M65.332 TRIGGER MIDDLE FINGER OF LEFT HAND: Primary | ICD-10-CM

## 2020-12-08 DIAGNOSIS — E78.00 PURE HYPERCHOLESTEROLEMIA: ICD-10-CM

## 2020-12-08 DIAGNOSIS — N18.30 STAGE 3 CHRONIC KIDNEY DISEASE, UNSPECIFIED WHETHER STAGE 3A OR 3B CKD (HCC): ICD-10-CM

## 2020-12-08 DIAGNOSIS — R55 NEAR SYNCOPE: ICD-10-CM

## 2020-12-08 DIAGNOSIS — I10 ESSENTIAL HYPERTENSION: Primary | ICD-10-CM

## 2020-12-08 PROBLEM — J40 BRONCHITIS: Status: RESOLVED | Noted: 2019-01-02 | Resolved: 2020-12-08

## 2020-12-08 PROCEDURE — 99214 OFFICE O/P EST MOD 30 MIN: CPT | Performed by: INTERNAL MEDICINE

## 2020-12-08 PROCEDURE — 97110 THERAPEUTIC EXERCISES: CPT

## 2020-12-08 PROCEDURE — 97140 MANUAL THERAPY 1/> REGIONS: CPT

## 2020-12-14 ENCOUNTER — OFFICE VISIT (OUTPATIENT)
Dept: OBGYN CLINIC | Facility: CLINIC | Age: 75
End: 2020-12-14

## 2020-12-14 VITALS
BODY MASS INDEX: 34.87 KG/M2 | HEART RATE: 71 BPM | DIASTOLIC BLOOD PRESSURE: 73 MMHG | WEIGHT: 217 LBS | HEIGHT: 66 IN | SYSTOLIC BLOOD PRESSURE: 170 MMHG

## 2020-12-14 DIAGNOSIS — M65.332 TRIGGER MIDDLE FINGER OF LEFT HAND: Primary | ICD-10-CM

## 2020-12-14 PROCEDURE — 99024 POSTOP FOLLOW-UP VISIT: CPT | Performed by: SURGERY

## 2021-01-20 DIAGNOSIS — Z23 ENCOUNTER FOR IMMUNIZATION: ICD-10-CM

## 2021-01-27 ENCOUNTER — IMMUNIZATIONS (OUTPATIENT)
Dept: FAMILY MEDICINE CLINIC | Facility: HOSPITAL | Age: 76
End: 2021-01-27

## 2021-01-27 DIAGNOSIS — Z23 ENCOUNTER FOR IMMUNIZATION: Primary | ICD-10-CM

## 2021-01-27 PROCEDURE — 0011A SARS-COV-2 / COVID-19 MRNA VACCINE (MODERNA) 100 MCG: CPT

## 2021-01-27 PROCEDURE — 91301 SARS-COV-2 / COVID-19 MRNA VACCINE (MODERNA) 100 MCG: CPT

## 2021-02-23 ENCOUNTER — IMMUNIZATIONS (OUTPATIENT)
Dept: FAMILY MEDICINE CLINIC | Facility: HOSPITAL | Age: 76
End: 2021-02-23

## 2021-02-23 DIAGNOSIS — Z23 ENCOUNTER FOR IMMUNIZATION: Primary | ICD-10-CM

## 2021-02-23 PROCEDURE — 91301 SARS-COV-2 / COVID-19 MRNA VACCINE (MODERNA) 100 MCG: CPT

## 2021-02-23 PROCEDURE — 0012A SARS-COV-2 / COVID-19 MRNA VACCINE (MODERNA) 100 MCG: CPT

## 2021-04-12 ENCOUNTER — OFFICE VISIT (OUTPATIENT)
Dept: INTERNAL MEDICINE CLINIC | Facility: CLINIC | Age: 76
End: 2021-04-12
Payer: MEDICARE

## 2021-04-12 VITALS
WEIGHT: 221.6 LBS | OXYGEN SATURATION: 99 % | HEART RATE: 78 BPM | DIASTOLIC BLOOD PRESSURE: 66 MMHG | HEIGHT: 73 IN | TEMPERATURE: 97.6 F | SYSTOLIC BLOOD PRESSURE: 132 MMHG | BODY MASS INDEX: 29.37 KG/M2

## 2021-04-12 DIAGNOSIS — E78.00 PURE HYPERCHOLESTEROLEMIA: ICD-10-CM

## 2021-04-12 DIAGNOSIS — I10 ESSENTIAL HYPERTENSION: Primary | ICD-10-CM

## 2021-04-12 DIAGNOSIS — N52.9 ERECTILE DYSFUNCTION, UNSPECIFIED ERECTILE DYSFUNCTION TYPE: ICD-10-CM

## 2021-04-12 PROCEDURE — 99214 OFFICE O/P EST MOD 30 MIN: CPT | Performed by: INTERNAL MEDICINE

## 2021-04-12 RX ORDER — SILDENAFIL 50 MG/1
50 TABLET, FILM COATED ORAL DAILY PRN
Qty: 10 TABLET | Refills: 5 | Status: SHIPPED | OUTPATIENT
Start: 2021-04-12

## 2021-04-12 NOTE — PATIENT INSTRUCTIONS
Problem List Items Addressed This Visit        Cardiovascular and Mediastinum    Essential hypertension - Primary      Better controlled, discussed with patient that he could develop a little ankle swelling as an adverse side effect to the amlodipine, but if it is not bothersome, we do not need to stop this medication    Continue meds along with healthy diet and exercise            Other    Pure hypercholesterolemia      Continue pravastatin along with healthy diet and exercise         Erectile dysfunction      Will do a trial of Viagra, patient not on medications that will acutely dropped his blood pressure         Relevant Medications    sildenafil (VIAGRA) 50 MG tablet

## 2021-04-12 NOTE — ASSESSMENT & PLAN NOTE
Better controlled, discussed with patient that he could develop a little ankle swelling as an adverse side effect to the amlodipine, but if it is not bothersome, we do not need to stop this medication    Continue meds along with healthy diet and exercise

## 2021-04-12 NOTE — PROGRESS NOTES
Assessment/Plan:    Erectile dysfunction   Will do a trial of Viagra, patient not on medications that will acutely dropped his blood pressure    Essential hypertension   Better controlled, discussed with patient that he could develop a little ankle swelling as an adverse side effect to the amlodipine, but if it is not bothersome, we do not need to stop this medication  Continue meds along with healthy diet and exercise    Pure hypercholesterolemia   Continue pravastatin along with healthy diet and exercise       Diagnoses and all orders for this visit:    Essential hypertension    Erectile dysfunction, unspecified erectile dysfunction type  -     sildenafil (VIAGRA) 50 MG tablet; Take 1 tablet (50 mg total) by mouth daily as needed for erectile dysfunction    Pure hypercholesterolemia        BMI Counseling: Body mass index is 29 24 kg/m²  The BMI is above normal  Nutrition recommendations include encouraging healthy choices of fruits and vegetables and moderation in carbohydrate intake  Exercise recommendations include exercising 3-5 times per week  Subjective:      Patient ID: Reta Primrose is a 76 y o  male  Hypertension: Patient reports compliance with medications, he has developed a little ankle swell at the end of the days, but is not that bothersome for him  Erectile dysfunction:   Patient asking about Viagra with his other medications that he is on    Hypercholesterolemia: pt on statin, no significant muscle aches      The following portions of the patient's history were reviewed and updated as appropriate: allergies, current medications, past family history, past medical history, past social history, past surgical history and problem list     Review of Systems   Constitutional: Negative for chills, fatigue and fever  HENT: Negative for congestion, nosebleeds, postnasal drip, sore throat and trouble swallowing  Eyes: Negative for pain     Respiratory: Negative for cough, chest tightness, shortness of breath and wheezing  Cardiovascular: Negative for chest pain, palpitations and leg swelling  Gastrointestinal: Negative for abdominal pain, constipation, diarrhea, nausea and vomiting  Endocrine: Negative for polydipsia and polyuria  Genitourinary: Negative for dysuria, flank pain and hematuria  Musculoskeletal: Negative for arthralgias and myalgias  Skin: Negative for rash  Neurological: Negative for dizziness, tremors, light-headedness and headaches  Hematological: Does not bruise/bleed easily  Psychiatric/Behavioral: Negative for confusion and dysphoric mood  The patient is not nervous/anxious  Objective:      /66   Pulse 78   Temp 97 6 °F (36 4 °C) (Temporal)   Ht 6' 1" (1 854 m)   Wt 101 kg (221 lb 9 6 oz)   SpO2 99%   BMI 29 24 kg/m²          Physical Exam  Vitals signs reviewed  Constitutional:       General: He is not in acute distress  Appearance: Normal appearance  He is well-developed  HENT:      Head: Normocephalic and atraumatic  Right Ear: External ear normal       Left Ear: External ear normal    Eyes:      General: No scleral icterus  Conjunctiva/sclera: Conjunctivae normal    Neck:      Musculoskeletal: Normal range of motion and neck supple  Thyroid: No thyromegaly  Trachea: No tracheal deviation  Cardiovascular:      Rate and Rhythm: Normal rate and regular rhythm  Heart sounds: Normal heart sounds  No murmur  Pulmonary:      Effort: Pulmonary effort is normal  No respiratory distress  Breath sounds: Normal breath sounds  No wheezing or rales  Abdominal:      General: Bowel sounds are normal       Palpations: Abdomen is soft  Tenderness: There is no abdominal tenderness  There is no guarding or rebound  Musculoskeletal:      Right lower leg: No edema  Left lower leg: No edema  Lymphadenopathy:      Cervical: No cervical adenopathy  Neurological:      General: No focal deficit present  Mental Status: He is alert and oriented to person, place, and time  Psychiatric:         Mood and Affect: Mood normal          Behavior: Behavior normal          Thought Content:  Thought content normal          Judgment: Judgment normal

## 2021-07-12 ENCOUNTER — OFFICE VISIT (OUTPATIENT)
Dept: INTERNAL MEDICINE CLINIC | Facility: CLINIC | Age: 76
End: 2021-07-12
Payer: MEDICARE

## 2021-07-12 VITALS
DIASTOLIC BLOOD PRESSURE: 74 MMHG | HEIGHT: 73 IN | BODY MASS INDEX: 29.69 KG/M2 | SYSTOLIC BLOOD PRESSURE: 138 MMHG | TEMPERATURE: 97.6 F | OXYGEN SATURATION: 98 % | WEIGHT: 224 LBS | HEART RATE: 82 BPM

## 2021-07-12 DIAGNOSIS — Z12.5 SCREENING FOR PROSTATE CANCER: ICD-10-CM

## 2021-07-12 DIAGNOSIS — I10 ESSENTIAL HYPERTENSION: Primary | ICD-10-CM

## 2021-07-12 DIAGNOSIS — I71.4 ABDOMINAL AORTIC ANEURYSM (AAA) WITHOUT RUPTURE (HCC): ICD-10-CM

## 2021-07-12 DIAGNOSIS — I71.4 ABDOMINAL AORTIC ANEURYSM, WITHOUT RUPTURE (HCC): ICD-10-CM

## 2021-07-12 DIAGNOSIS — R60.0 EDEMA OF EXTREMITIES: ICD-10-CM

## 2021-07-12 DIAGNOSIS — R06.02 SOB (SHORTNESS OF BREATH) ON EXERTION: ICD-10-CM

## 2021-07-12 DIAGNOSIS — E55.9 VITAMIN D DEFICIENCY: ICD-10-CM

## 2021-07-12 DIAGNOSIS — E78.00 PURE HYPERCHOLESTEROLEMIA: ICD-10-CM

## 2021-07-12 DIAGNOSIS — Z00.00 MEDICARE ANNUAL WELLNESS VISIT, SUBSEQUENT: ICD-10-CM

## 2021-07-12 PROCEDURE — 1123F ACP DISCUSS/DSCN MKR DOCD: CPT | Performed by: INTERNAL MEDICINE

## 2021-07-12 PROCEDURE — G0439 PPPS, SUBSEQ VISIT: HCPCS | Performed by: INTERNAL MEDICINE

## 2021-07-12 PROCEDURE — 99214 OFFICE O/P EST MOD 30 MIN: CPT | Performed by: INTERNAL MEDICINE

## 2021-07-12 RX ORDER — METOPROLOL SUCCINATE 50 MG/1
50 TABLET, EXTENDED RELEASE ORAL DAILY
Qty: 90 TABLET | Refills: 3 | Status: SHIPPED | OUTPATIENT
Start: 2021-07-12 | End: 2022-07-20

## 2021-07-12 RX ORDER — AMLODIPINE BESYLATE 5 MG/1
5 TABLET ORAL DAILY
Qty: 90 TABLET | Refills: 3 | Status: SHIPPED | OUTPATIENT
Start: 2021-07-12

## 2021-07-12 NOTE — ASSESSMENT & PLAN NOTE
Lab Results   Component Value Date    EGFR 54 11/03/2020    EGFR 51 10/31/2020    EGFR 46 10/30/2020    CREATININE 1 30 11/03/2020    CREATININE 1 35 (H) 10/31/2020    CREATININE 1 47 (H) 10/30/2020    avoid NSAIDs, will recheck

## 2021-07-12 NOTE — PATIENT INSTRUCTIONS
Problem List Items Addressed This Visit        Cardiovascular and Mediastinum    Essential hypertension - Primary       With patient having some ankle swelling, I recommend decreasing amlodipine from 10 mg daily to 5 mg daily, and will increase metoprolol from 25 mg daily to 50 mg daily and recheck in a few months         Relevant Medications    metoprolol succinate (TOPROL-XL) 50 mg 24 hr tablet    amLODIPine (NORVASC) 5 mg tablet    AAA (abdominal aortic aneurysm) (HCC)      Slight large mint June 2020 with recommendations to recheck in 4 years, will check sooner 1-2 years            Other    Pure hypercholesterolemia      Continue statin along with healthy diet and exercise         Relevant Orders    Comprehensive metabolic panel    CBC and differential    Lipid Panel with Direct LDL reflex    TSH, 3rd generation with Free T4 reflex    Medicare annual wellness visit, subsequent     Discussed preventative health, cancer screening, immunizations, and safety issues  Last colonoscopy 2016 with recommendations to recheck in 7 years  Pt is up to date with immunizations         Edema of extremities      No signs of heart failure on exam, discussed causes edema including inactivity, excess all, side effect of medication like amlodipine  Will check labs  Patient instructed to reach out if not improving with the lower dose of amlodipine         SOB (shortness of breath) on exertion     Will get stress test and check labs           Other Visit Diagnoses     Abdominal aortic aneurysm, without rupture ([facility] [address] )        Screening for prostate cancer        Relevant Orders    PSA, Total Screen    Vitamin D deficiency        Relevant Orders    Vitamin D 25 hydroxy          Medicare Preventive Visit Patient Instructions  Thank you for completing your Welcome to Medicare Visit or Medicare Annual Wellness Visit today  Your next wellness visit will be due in one year (7/13/2022)    The screening/preventive services that you may require over the next 5-10 years are detailed below  Some tests may not apply to you based off risk factors and/or age  Screening tests ordered at today's visit but not completed yet may show as past due  Also, please note that scanned in results may not display below  Preventive Screenings:  Service Recommendations Previous Testing/Comments   Colorectal Cancer Screening  · Colonoscopy    · Fecal Occult Blood Test (FOBT)/Fecal Immunochemical Test (FIT)  · Fecal DNA/Cologuard Test  · Flexible Sigmoidoscopy Age: 54-65 years old   Colonoscopy: every 10 years (May be performed more frequently if at higher risk)  OR  FOBT/FIT: every 1 year  OR  Cologuard: every 3 years  OR  Sigmoidoscopy: every 5 years  Screening may be recommended earlier than age 48 if at higher risk for colorectal cancer  Also, an individualized decision between you and your healthcare provider will decide whether screening between the ages of 74-80 would be appropriate  Colonoscopy: 08/17/2016  FOBT/FIT: Not on file  Cologuard: Not on file  Sigmoidoscopy: Not on file    Screening Current     Prostate Cancer Screening Individualized decision between patient and health care provider in men between ages of 53-78   Medicare will cover every 12 months beginning on the day after your 50th birthday PSA: 3 0 ng/mL     Screening Not Indicated     Hepatitis C Screening Once for adults born between 1945 and 1965  More frequently in patients at high risk for Hepatitis C Hep C Antibody: 09/08/2017    Screening Current   Diabetes Screening 1-2 times per year if you're at risk for diabetes or have pre-diabetes Fasting glucose: 94 mg/dL   A1C: 5 3 %    Screening Current   Cholesterol Screening Once every 5 years if you don't have a lipid disorder  May order more often based on risk factors  Lipid panel: 10/31/2020    Screening Not Indicated  History Lipid Disorder      Other Preventive Screenings Covered by Medicare:  1   Abdominal Aortic Aneurysm (AAA) Screening: covered once if your at risk  You're considered to be at risk if you have a family history of AAA or a male between the age of 73-68 who smoking at least 100 cigarettes in your lifetime  2  Lung Cancer Screening: covers low dose CT scan once per year if you meet all of the following conditions: (1) Age 50-69; (2) No signs or symptoms of lung cancer; (3) Current smoker or have quit smoking within the last 15 years; (4) You have a tobacco smoking history of at least 30 pack years (packs per day x number of years you smoked); (5) You get a written order from a healthcare provider  3  Glaucoma Screening: covered annually if you're considered high risk: (1) You have diabetes OR (2) Family history of glaucoma OR (3)  aged 48 and older OR (3)  American aged 72 and older  3  Osteoporosis Screening: covered every 2 years if you meet one of the following conditions: (1) Have a vertebral abnormality; (2) On glucocorticoid therapy for more than 3 months; (3) Have primary hyperparathyroidism; (4) On osteoporosis medications and need to assess response to drug therapy  5  HIV Screening: covered annually if you're between the age of 12-76  Also covered annually if you are younger than 13 and older than 72 with risk factors for HIV infection  For pregnant patients, it is covered up to 3 times per pregnancy  Immunizations:  Immunization Recommendations   Influenza Vaccine Annual influenza vaccination during flu season is recommended for all persons aged >= 6 months who do not have contraindications   Pneumococcal Vaccine (Prevnar and Pneumovax)  * Prevnar = PCV13  * Pneumovax = PPSV23 Adults 25-60 years old: 1-3 doses may be recommended based on certain risk factors  Adults 72 years old: Prevnar (PCV13) vaccine recommended followed by Pneumovax (PPSV23) vaccine  If already received PPSV23 since turning 65, then PCV13 recommended at least one year after PPSV23 dose     Hepatitis B Vaccine 3 dose series if at intermediate or high risk (ex: diabetes, end stage renal disease, liver disease)   Tetanus (Td) Vaccine - COST NOT COVERED BY MEDICARE PART B Following completion of primary series, a booster dose should be given every 10 years to maintain immunity against tetanus  Td may also be given as tetanus wound prophylaxis  Tdap Vaccine - COST NOT COVERED BY MEDICARE PART B Recommended at least once for all adults  For pregnant patients, recommended with each pregnancy  Shingles Vaccine (Shingrix) - COST NOT COVERED BY MEDICARE PART B  2 shot series recommended in those aged 48 and above     Health Maintenance Due:      Topic Date Due    Colorectal Cancer Screening  08/17/2026    Hepatitis C Screening  Completed     Immunizations Due:      Topic Date Due    Influenza Vaccine (1) 09/01/2021     Advance Directives   What are advance directives? Advance directives are legal documents that state your wishes and plans for medical care  These plans are made ahead of time in case you lose your ability to make decisions for yourself  Advance directives can apply to any medical decision, such as the treatments you want, and if you want to donate organs  What are the types of advance directives? There are many types of advance directives, and each state has rules about how to use them  You may choose a combination of any of the following:  · Living will: This is a written record of the treatment you want  You can also choose which treatments you do not want, which to limit, and which to stop at a certain time  This includes surgery, medicine, IV fluid, and tube feedings  · Durable power of  for healthcare [facility]): This is a written record that states who you want to make healthcare choices for you when you are unable to make them for yourself  This person, called a proxy, is usually a family member or a friend  You may choose more than 1 proxy    · Do not resuscitate (DNR) order:  A DNR order is used in case your heart stops beating or you stop breathing  It is a request not to have certain forms of treatment, such as CPR  A DNR order may be included in other types of advance directives  · Medical directive: This covers the care that you want if you are in a coma, near death, or unable to make decisions for yourself  You can list the treatments you want for each condition  Treatment may include pain medicine, surgery, blood transfusions, dialysis, IV or tube feedings, and a ventilator (breathing machine)  · Values history: This document has questions about your views, beliefs, and how you feel and think about life  This information can help others choose the care that you would choose  Why are advance directives important? An advance directive helps you control your care  Although spoken wishes may be used, it is better to have your wishes written down  Spoken wishes can be misunderstood, or not followed  Treatments may be given even if you do not want them  An advance directive may make it easier for your family to make difficult choices about your care  Weight Management   Why it is important to manage your weight:  Being overweight increases your risk of health conditions such as heart disease, high blood pressure, type 2 diabetes, and certain types of cancer  It can also increase your risk for osteoarthritis, sleep apnea, and other respiratory problems  Aim for a slow, steady weight loss  Even a small amount of weight loss can lower your risk of health problems  How to lose weight safely:  A safe and healthy way to lose weight is to eat fewer calories and get regular exercise  You can lose up about 1 pound a week by decreasing the number of calories you eat by 500 calories each day  Healthy meal plan for weight management:  A healthy meal plan includes a variety of foods, contains fewer calories, and helps you stay healthy   A healthy meal plan includes the following:  · Eat whole-grain foods more often   A healthy meal plan should contain fiber  Fiber is the part of grains, fruits, and vegetables that is not broken down by your body  Whole-grain foods are healthy and provide extra fiber in your diet  Some examples of whole-grain foods are whole-wheat breads and pastas, oatmeal, brown rice, and bulgur  · Eat a variety of vegetables every day  Include dark, leafy greens such as spinach, kale, cindy greens, and mustard greens  Eat yellow and orange vegetables such as carrots, sweet potatoes, and winter squash  · Eat a variety of fruits every day  Choose fresh or canned fruit (canned in its own juice or light syrup) instead of juice  Fruit juice has very little or no fiber  · Eat low-fat dairy foods  Drink fat-free (skim) milk or 1% milk  Eat fat-free yogurt and low-fat cottage cheese  Try low-fat cheeses such as mozzarella and other reduced-fat cheeses  · Choose meat and other protein foods that are low in fat  Choose beans or other legumes such as split peas or lentils  Choose fish, skinless poultry (chicken or turkey), or lean cuts of red meat (beef or pork)  Before you cook meat or poultry, cut off any visible fat  · Use less fat and oil  Try baking foods instead of frying them  Add less fat, such as margarine, sour cream, regular salad dressing and mayonnaise to foods  Eat fewer high-fat foods  Some examples of high-fat foods include french fries, doughnuts, ice cream, and cakes  · Eat fewer sweets  Limit foods and drinks that are high in sugar  This includes candy, cookies, regular soda, and sweetened drinks  Exercise:  Exercise at least 30 minutes per day on most days of the week  Some examples of exercise include walking, biking, dancing, and swimming  You can also fit in more physical activity by taking the stairs instead of the elevator or parking farther away from stores  Ask your healthcare provider about the best exercise plan for you        © Copyright [employer] 2018 Information is for End User's use only and may not be sold, redistributed or otherwise used for commercial purposes   All illustrations and images included in CareNotes® are the copyrighted property of A D A M , Inc  or [facility] Derek Tate

## 2021-07-12 NOTE — ASSESSMENT & PLAN NOTE
With patient having some ankle swelling, I recommend decreasing amlodipine from 10 mg daily to 5 mg daily, and will increase metoprolol from 25 mg daily to 50 mg daily and recheck in a few months

## 2021-07-12 NOTE — PROGRESS NOTES
Assessment/Plan:    Medicare annual wellness visit, subsequent  Discussed preventative health, cancer screening, immunizations, and safety issues  Last colonoscopy 2016 with recommendations to recheck in 7 years  Pt is up to date with immunizations    Essential hypertension    With patient having some ankle swelling, I recommend decreasing amlodipine from 10 mg daily to 5 mg daily, and will increase metoprolol from 25 mg daily to 50 mg daily and recheck in a few months    AAA (abdominal aortic aneurysm) ([facility] [address] )   Slight large mint June 2020 with recommendations to recheck in 4 years, will check sooner 1-2 years    CKD (chronic kidney disease) stage 3, GFR 30-59 ml/min  Lab Results   Component Value Date    EGFR 54 11/03/2020    EGFR 51 10/31/2020    EGFR 46 10/30/2020    CREATININE 1 30 11/03/2020    CREATININE 1 35 (H) 10/31/2020    CREATININE 1 47 (H) 10/30/2020    avoid NSAIDs, will recheck    Pure hypercholesterolemia   Continue statin along with healthy diet and exercise    Edema of extremities   No signs of heart failure on exam, discussed causes edema including inactivity, excess all, side effect of medication like amlodipine  Will check labs  Patient instructed to reach out if not improving with the lower dose of amlodipine    SOB (shortness of breath) on exertion  Will get stress test and check labs       Diagnoses and all orders for this visit:    Essential hypertension  -     metoprolol succinate (TOPROL-XL) 50 mg 24 hr tablet; Take 1 tablet (50 mg total) by mouth daily  -     amLODIPine (NORVASC) 5 mg tablet; Take 1 tablet (5 mg total) by mouth daily    Medicare annual wellness visit, subsequent    Abdominal aortic aneurysm, without rupture ([facility] )    Abdominal aortic aneurysm (AAA) without rupture ([facility] )    Pure hypercholesterolemia  -     Comprehensive metabolic panel; Future  -     CBC and differential; Future  -     Lipid Panel with Direct LDL reflex;  Future  -     TSH, 3rd generation with Free T4 reflex; Future    Edema of extremities    Screening for prostate cancer  -     PSA, Total Screen; Future    Vitamin D deficiency  -     Vitamin D 25 hydroxy; Future    SOB (shortness of breath) on exertion  -     Echo stress test w contrast if indicated; Future          Subjective:      Patient ID: Seble Reyes is a 76 y o  male  Pt has been having some ankle swelling off and on over the past several months  No chest pain or SOB    Hypercholesterolemia:  Patient tolerating statin, no significant muscle aches    Mild aortic enlargement  Was seen June 2020 with recommendations to recheck in 4 years, will check in 1-2 years      The following portions of the patient's history were reviewed and updated as appropriate: allergies, current medications, past family history, past medical history, past social history, past surgical history and problem list     Review of Systems   Constitutional: Positive for fatigue  Negative for chills and fever  HENT: Negative for congestion, nosebleeds, postnasal drip, sore throat and trouble swallowing  Eyes: Negative for pain  Respiratory: Positive for shortness of breath ( mild with exertion)  Negative for cough, chest tightness and wheezing  Cardiovascular: Positive for leg swelling  Negative for chest pain and palpitations  Gastrointestinal: Negative for abdominal pain, constipation, diarrhea, nausea and vomiting  Endocrine: Negative for polydipsia and polyuria  Genitourinary: Negative for dysuria, flank pain and hematuria  Musculoskeletal: Negative for arthralgias, back pain and myalgias  Skin: Negative for rash  Neurological: Negative for dizziness, tremors and headaches  Hematological: Does not bruise/bleed easily  Psychiatric/Behavioral: Negative for confusion and dysphoric mood  The patient is not nervous/anxious            Objective:      /74   Pulse 82   Temp 97 6 °F (36 4 °C) (Temporal)   Ht 6' 1" (1 854 m)   Wt 102 kg (224 lb)   SpO2

## 2021-07-12 NOTE — ASSESSMENT & PLAN NOTE
Discussed preventative health, cancer screening, immunizations, and safety issues  Last colonoscopy 2016 with recommendations to recheck in 7 years    Pt is up to date with immunizations

## 2021-07-12 NOTE — PROGRESS NOTES
Assessment and Plan:     Problem List Items Addressed This Visit        Other    Medicare annual wellness visit, subsequent - Primary     Discussed preventative health, cancer screening, immunizations, and safety issues  Last colonoscopy 2016 with recommendations to recheck in 7 years  Pt is up to date with immunizations                Preventive health issues were discussed with patient, and age appropriate screening tests were ordered as noted in patient's After Visit Summary  Personalized health advice and appropriate referrals for health education or preventive services given if needed, as noted in patient's After Visit Summary       History of Present Illness:     Patient presents for Medicare Annual Wellness visit    Patient Care Team:  Joan Agee MD as PCP - General     Problem List:     Patient Active Problem List   Diagnosis    Impingement syndrome of right shoulder    Essential hypertension    Pure hypercholesterolemia    Pain in right hip    Trochanteric bursitis of right hip    Medicare annual wellness visit, subsequent    Left hand pain    Near syncope    AAA [facility]abdominal aortic aneurysm) ([facility] [address] )    Elevated serum creatinine    CKD (chronic kidney disease) stage 3, GFR 30-59 ml/min ([facility])    Trigger middle finger of left hand    Erectile dysfunction      Past Medical and Surgical History:     Past Medical History:   Diagnosis Date    Hypertension     Thyroid nodule      Past Surgical History:   Procedure Laterality Date    BACK SURGERY      KNEE ARTHROSCOPY      AZ INCISE FINGER TENDON SHEATH Left 11/30/2020    Procedure: LONG TRIGGER FINGER RELEASE;  Surgeon: Mariam Goldberg, MD;  Location[facility];  Service: Orthopedics    AZ KNEE SCOPE,MED/LAT MENISECTOMY Left 9/19/2017    Procedure: KNEE ARTHROSCOPY; PARTIAL MEDIAL MENISCECTOMY;  Surgeon: Lisa Kilpatrick MD;  Location: [facility];  Service: Orthopedics      Family History:     Family History   Problem Relation Age of Onset    Cancer Mother       Social History:     Social History     Socioeconomic History    Marital status: /Civil Union     Spouse name: None    Number of children: None    Years of education: None    Highest education level: None   Occupational History    Occupation: Retired    Tobacco Use    Smoking status: Former Smoker     Quit date:      Years since quittin 5    Smokeless tobacco: Never Used   Vaping Use    Vaping Use: Never used   Substance and Sexual Activity    Alcohol use: No     Comment: Social alcohol use - As per [employer]     Drug use: No    Sexual activity: None   Other Topics Concern    None   Social History Narrative    Advance directive on file      Social Determinants of Health     Financial Resource Strain:     Difficulty of Paying Living Expenses:    Food Insecurity:     Worried About Running Out of Food in the Last Year:     Ran Out of Food in the Last Year:    Transportation Needs:     Lack of Transportation (Medical):      Lack of Transportation (Non-Medical):    Physical Activity:     Days of Exercise per Week:     Minutes of Exercise per Session:    Stress:     Feeling of Stress :    Social Connections:     Frequency of Communication with Friends and Family:     Frequency of Social Gatherings with Friends and Family:     Attends Mu-ism Services:     Active Member of Clubs or Organizations:     Attends Club or Organization Meetings:     Marital Status:    Intimate Partner Violence:     Fear of Current or Ex-Partner:     Emotionally Abused:     Physically Abused:     Sexually Abused:       Medications and Allergies:     Current Outpatient Medications   Medication Sig Dispense Refill    amLODIPine (NORVASC) 10 mg tablet Take 1 tablet (10 mg total) by mouth daily 90 tablet 3    clotrimazole-betamethasone (LOTRISONE) 1-0 05 % cream KATEY 2 GRAMS EXT AA BID  2    fluocinonide (LIDEX) 0 05 % cream       fluorouracil (EFUDEX) 5 % cream APPLY TO NOSE BID X 3 WEEKS      metoprolol succinate (TOPROL-XL) 25 mg 24 hr tablet Take 1 tablet (25 mg total) by mouth daily 90 tablet 3    pravastatin (PRAVACHOL) 10 mg tablet Take 1 tablet (10 mg total) by mouth daily at bedtime 90 tablet 3    sildenafil (VIAGRA) 50 MG tablet Take 1 tablet (50 mg total) by mouth daily as needed for erectile dysfunction 10 tablet 5    meclizine (ANTIVERT) 12 5 MG tablet Take 1 tablet (12 5 mg total) by mouth every 8 (eight) hours as needed for dizziness for up to 3 days 9 tablet 0     No current facility-administered medications for this visit  No Known Allergies   Immunizations:     Immunization History   Administered Date(s) Administered    Hep A, adult 04/26/2017, 10/04/2017    INFLUENZA 09/22/2014, 09/05/2015, 09/09/2016, 09/21/2017, 09/10/2018, 09/22/2019    Influenza Split High Dose Preservative Free IM 09/21/2017, 09/10/2018, 09/22/2019    Pneumococcal Conjugate 13-Valent 10/11/2016    Pneumococcal Polysaccharide PPV23 08/29/2019    SARS-CoV-2 / COVID-19 mRNA IM (Moderna) 01/27/2021, 02/23/2021    Tdap 08/20/2019    Zoster 04/12/2018    Zoster Vaccine Recombinant 04/12/2018, 09/01/2018    influenza, trivalent, adjuvanted 09/22/2019      Health Maintenance:         Topic Date Due    Colorectal Cancer Screening  08/17/2026    Hepatitis C Screening  Completed         Topic Date Due    Influenza Vaccine (1) 09/01/2021      Medicare Health Risk Assessment:     /74   Pulse 82   Temp 97 6 °F (36 4 °C) (Temporal)   Ht 6' 1" (1 854 m)   Wt 102 kg (224 lb)   SpO2 98%   BMI 29 55 kg/m²      Arturo Meier is here for his Subsequent Wellness visit  Health Risk Assessment:   Patient rates overall health as fair  Patient feels that their physical health rating is slightly worse  Patient is satisfied with their life  Eyesight was rated as same  Hearing was rated as same  Patient feels that their emotional and mental health rating is same   Patients states they are never, rarely angry  Patient states they are sometimes unusually tired/fatigued  Pain experienced in the last 7 days has been none  Patient states that he has experienced no weight loss or gain in last 6 months  Depression Screening:   PHQ-2 Score: 0      Fall Risk Screening: In the past year, patient has experienced: no history of falling in past year      Home Safety:  Patient does not have trouble with stairs inside or outside of their home  Patient has working smoke alarms and has working carbon monoxide detector  Home safety hazards include: none  Nutrition:   Current diet is Regular  Medications:   Patient is currently taking over-the-counter supplements  OTC medications include: see medication list  Patient is able to manage medications  Activities of Daily Living (ADLs)/Instrumental Activities of Daily Living (IADLs):   Walk and transfer into and out of bed and chair?: Yes  Dress and groom yourself?: Yes    Bathe or shower yourself?: Yes    Feed yourself?  Yes  Do your laundry/housekeeping?: Yes  Manage your money, pay your bills and track your expenses?: Yes  Make your own meals?: Yes    Do your own shopping?: Yes    Previous Hospitalizations:   Any hospitalizations or ED visits within the last 12 months?: Yes    How many hospitalizations have you had in the last year?: 1-2    Advance Care Planning:   Living will: Yes    Durable POA for healthcare: No    Advanced directive: Yes      Cognitive Screening:   Provider or family/friend/caregiver concerned regarding cognition?: No    PREVENTIVE SCREENINGS      Cardiovascular Screening:    General: Screening Not Indicated, History Lipid Disorder, Risks and Benefits Discussed and Screening Current      Diabetes Screening:     General: Screening Current and Risks and Benefits Discussed      Colorectal Cancer Screening:     General: Screening Current      Prostate Cancer Screening:    General: Screening Not Indicated      Osteoporosis Screening: General: Screening Not Indicated      Abdominal Aortic Aneurysm (AAA) Screening:    Risk factors include: age between 73-67 yo and tobacco use        General: Screening Not Indicated and History AAA      Lung Cancer Screening:     General: Screening Not Indicated      Hepatitis C Screening:    General: Screening Current    Screening, Brief Intervention, and Referral to Treatment (SBIRT)    Screening    Typical number of drinks in a week: 0    Single Item Drug Screening:  How often have you used an illegal drug (including marijuana) or a prescription medication for non-medical reasons in the past year? never    Single Item Drug Screen Score: 0  Interpretation: Negative screen for possible drug use disorder    Brief Intervention  Alcohol & drug use screenings were reviewed  No concerns regarding substance use disorder identified  Other Counseling Topics:   Car/seat belt/driving safety, skin self-exam and sunscreen         Montserrat Rodriguez MD

## 2021-07-12 NOTE — ASSESSMENT & PLAN NOTE
No signs of heart failure on exam, discussed causes edema including inactivity, excess all, side effect of medication like amlodipine  Will check labs    Patient instructed to reach out if not improving with the lower dose of amlodipine

## 2021-07-13 ENCOUNTER — APPOINTMENT (OUTPATIENT)
Dept: LAB | Age: 76
End: 2021-07-13
Payer: MEDICARE

## 2021-07-13 DIAGNOSIS — E55.9 VITAMIN D DEFICIENCY: ICD-10-CM

## 2021-07-13 DIAGNOSIS — E78.00 PURE HYPERCHOLESTEROLEMIA: ICD-10-CM

## 2021-07-13 DIAGNOSIS — Z12.5 SCREENING FOR PROSTATE CANCER: ICD-10-CM

## 2021-07-13 LAB
25(OH)D3 SERPL-MCNC: 27.2 NG/ML (ref 30–100)
ALBUMIN SERPL BCP-MCNC: 3.7 G/DL (ref 3.5–5)
ALP SERPL-CCNC: 58 U/L (ref 46–116)
ALT SERPL W P-5'-P-CCNC: 31 U/L (ref 12–78)
ANION GAP SERPL CALCULATED.3IONS-SCNC: 4 MMOL/L (ref 4–13)
AST SERPL W P-5'-P-CCNC: 26 U/L (ref 5–45)
BASOPHILS # BLD AUTO: 0.06 THOUSANDS/ΜL (ref 0–0.1)
BASOPHILS NFR BLD AUTO: 1 % (ref 0–1)
BILIRUB SERPL-MCNC: 0.64 MG/DL (ref 0.2–1)
BUN SERPL-MCNC: 24 MG/DL (ref 5–25)
CALCIUM SERPL-MCNC: 9 MG/DL (ref 8.3–10.1)
CHLORIDE SERPL-SCNC: 104 MMOL/L (ref 100–108)
CHOLEST SERPL-MCNC: 182 MG/DL (ref 50–200)
CO2 SERPL-SCNC: 28 MMOL/L (ref 21–32)
CREAT SERPL-MCNC: 1.2 MG/DL (ref 0.6–1.3)
EOSINOPHIL # BLD AUTO: 0.13 THOUSAND/ΜL (ref 0–0.61)
EOSINOPHIL NFR BLD AUTO: 2 % (ref 0–6)
ERYTHROCYTE [DISTWIDTH] IN BLOOD BY AUTOMATED COUNT: 13.3 % (ref 11.6–15.1)
GFR SERPL CREATININE-BSD FRML MDRD: 59 ML/MIN/1.73SQ M
GLUCOSE P FAST SERPL-MCNC: 91 MG/DL (ref 65–99)
HCT VFR BLD AUTO: 40.9 % (ref 36.5–49.3)
HDLC SERPL-MCNC: 53 MG/DL
HGB BLD-MCNC: 13.5 G/DL (ref 12–17)
IMM GRANULOCYTES # BLD AUTO: 0.01 THOUSAND/UL (ref 0–0.2)
IMM GRANULOCYTES NFR BLD AUTO: 0 % (ref 0–2)
LDLC SERPL CALC-MCNC: 111 MG/DL (ref 0–100)
LYMPHOCYTES # BLD AUTO: 1.63 THOUSANDS/ΜL (ref 0.6–4.47)
LYMPHOCYTES NFR BLD AUTO: 26 % (ref 14–44)
MCH RBC QN AUTO: 30.8 PG (ref 26.8–34.3)
MCHC RBC AUTO-ENTMCNC: 33 G/DL (ref 31.4–37.4)
MCV RBC AUTO: 93 FL (ref 82–98)
MONOCYTES # BLD AUTO: 0.51 THOUSAND/ΜL (ref 0.17–1.22)
MONOCYTES NFR BLD AUTO: 8 % (ref 4–12)
NEUTROPHILS # BLD AUTO: 3.98 THOUSANDS/ΜL (ref 1.85–7.62)
NEUTS SEG NFR BLD AUTO: 63 % (ref 43–75)
NRBC BLD AUTO-RTO: 0 /100 WBCS
PLATELET # BLD AUTO: 320 THOUSANDS/UL (ref 149–390)
PMV BLD AUTO: 10.4 FL (ref 8.9–12.7)
POTASSIUM SERPL-SCNC: 3.6 MMOL/L (ref 3.5–5.3)
PROT SERPL-MCNC: 7.6 G/DL (ref 6.4–8.2)
PSA SERPL-MCNC: 3.5 NG/ML (ref 0–4)
RBC # BLD AUTO: 4.39 MILLION/UL (ref 3.88–5.62)
SODIUM SERPL-SCNC: 136 MMOL/L (ref 136–145)
T4 FREE SERPL-MCNC: 0.95 NG/DL (ref 0.76–1.46)
TRIGL SERPL-MCNC: 92 MG/DL
TSH SERPL DL<=0.05 MIU/L-ACNC: 4.11 UIU/ML (ref 0.36–3.74)
WBC # BLD AUTO: 6.32 THOUSAND/UL (ref 4.31–10.16)

## 2021-07-13 PROCEDURE — 84439 ASSAY OF FREE THYROXINE: CPT

## 2021-07-13 PROCEDURE — 80061 LIPID PANEL: CPT

## 2021-07-13 PROCEDURE — 85025 COMPLETE CBC W/AUTO DIFF WBC: CPT

## 2021-07-13 PROCEDURE — G0103 PSA SCREENING: HCPCS

## 2021-07-13 PROCEDURE — 84443 ASSAY THYROID STIM HORMONE: CPT

## 2021-07-13 PROCEDURE — 36415 COLL VENOUS BLD VENIPUNCTURE: CPT

## 2021-07-13 PROCEDURE — 80053 COMPREHEN METABOLIC PANEL: CPT

## 2021-07-13 PROCEDURE — 82306 VITAMIN D 25 HYDROXY: CPT

## 2021-08-05 ENCOUNTER — HOSPITAL ENCOUNTER (OUTPATIENT)
Dept: NON INVASIVE DIAGNOSTICS | Facility: CLINIC | Age: 76
Discharge: HOME | End: 2021-08-05
Payer: COMMERCIAL

## 2021-08-05 DIAGNOSIS — E78.00 HIGH CHOLESTEROL: ICD-10-CM

## 2021-08-05 PROCEDURE — 93922 UPR/L XTREMITY ART 2 LEVELS: CPT

## 2021-08-05 PROCEDURE — VASC: Performed by: SURGERY

## 2021-08-18 ENCOUNTER — TELEPHONE (OUTPATIENT)
Dept: NON INVASIVE DIAGNOSTICS | Facility: CLINIC | Age: 76
End: 2021-08-18

## 2021-08-20 ENCOUNTER — HOSPITAL ENCOUNTER (OUTPATIENT)
Dept: NON INVASIVE DIAGNOSTICS | Facility: CLINIC | Age: 76
Discharge: HOME | End: 2021-08-20
Payer: MEDICARE

## 2021-08-20 DIAGNOSIS — R06.02 SOB (SHORTNESS OF BREATH) ON EXERTION: ICD-10-CM

## 2021-08-20 LAB
CHEST PAIN STATEMENT: NORMAL
MAX DIASTOLIC BP: 90 MMHG
MAX HEART RATE: 139 BPM
MAX PREDICTED HEART RATE: 145 BPM
MAX. SYSTOLIC BP: 180 MMHG
PROTOCOL NAME: NORMAL
TARGET HR FORMULA: NORMAL
TEST INDICATION: NORMAL
TIME IN EXERCISE PHASE: NORMAL

## 2021-08-20 PROCEDURE — 93350 STRESS TTE ONLY: CPT

## 2021-08-20 PROCEDURE — 93351 STRESS TTE COMPLETE: CPT | Performed by: INTERNAL MEDICINE

## 2021-10-14 ENCOUNTER — ESTABLISHED COMPREHENSIVE EXAM (OUTPATIENT)
Dept: URBAN - METROPOLITAN AREA CLINIC 6 | Facility: CLINIC | Age: 76
End: 2021-10-14

## 2021-10-14 DIAGNOSIS — H04.123: ICD-10-CM

## 2021-10-14 DIAGNOSIS — Z98.890: ICD-10-CM

## 2021-10-14 DIAGNOSIS — H02.135: ICD-10-CM

## 2021-10-14 DIAGNOSIS — H02.132: ICD-10-CM

## 2021-10-14 DIAGNOSIS — H43.813: ICD-10-CM

## 2021-10-14 DIAGNOSIS — H25.13: ICD-10-CM

## 2021-10-14 PROCEDURE — 92014 COMPRE OPH EXAM EST PT 1/>: CPT

## 2021-10-14 PROCEDURE — G8427 DOCREV CUR MEDS BY ELIG CLIN: HCPCS

## 2021-10-14 PROCEDURE — 1036F TOBACCO NON-USER: CPT

## 2021-10-14 ASSESSMENT — VISUAL ACUITY
OS_CC: 20/20
OD_CC: 20/25

## 2021-10-14 ASSESSMENT — TONOMETRY
OS_IOP_MMHG: 20
OD_IOP_MMHG: 21

## 2021-12-08 DIAGNOSIS — E78.00 PURE HYPERCHOLESTEROLEMIA: ICD-10-CM

## 2021-12-08 RX ORDER — PRAVASTATIN SODIUM 10 MG
TABLET ORAL
Qty: 90 TABLET | Refills: 3 | Status: SHIPPED | OUTPATIENT
Start: 2021-12-08

## 2022-04-18 ENCOUNTER — LAB REQUISITION (OUTPATIENT)
Dept: LAB | Facility: HOSPITAL | Age: 77
End: 2022-04-18
Payer: MEDICARE

## 2022-04-18 DIAGNOSIS — D48.5 NEOPLASM OF UNCERTAIN BEHAVIOR OF SKIN: ICD-10-CM

## 2022-04-18 PROCEDURE — 88305 TISSUE EXAM BY PATHOLOGIST: CPT | Performed by: STUDENT IN AN ORGANIZED HEALTH CARE EDUCATION/TRAINING PROGRAM

## 2022-04-18 PROCEDURE — 88342 IMHCHEM/IMCYTCHM 1ST ANTB: CPT | Performed by: STUDENT IN AN ORGANIZED HEALTH CARE EDUCATION/TRAINING PROGRAM

## 2022-04-18 PROCEDURE — 88341 IMHCHEM/IMCYTCHM EA ADD ANTB: CPT | Performed by: STUDENT IN AN ORGANIZED HEALTH CARE EDUCATION/TRAINING PROGRAM

## 2022-07-20 DIAGNOSIS — I10 ESSENTIAL HYPERTENSION: ICD-10-CM

## 2022-07-20 RX ORDER — METOPROLOL SUCCINATE 50 MG/1
TABLET, EXTENDED RELEASE ORAL
Qty: 90 TABLET | Refills: 3 | Status: SHIPPED | OUTPATIENT
Start: 2022-07-20

## 2022-09-07 DIAGNOSIS — I10 ESSENTIAL HYPERTENSION: ICD-10-CM

## 2022-09-07 RX ORDER — AMLODIPINE BESYLATE 5 MG/1
TABLET ORAL
Qty: 90 TABLET | Refills: 3 | Status: SHIPPED | OUTPATIENT
Start: 2022-09-07

## 2022-09-22 ENCOUNTER — TELEPHONE (OUTPATIENT)
Dept: INTERNAL MEDICINE CLINIC | Facility: CLINIC | Age: 77
End: 2022-09-22

## 2022-09-22 DIAGNOSIS — E55.9 VITAMIN D DEFICIENCY: ICD-10-CM

## 2022-09-22 DIAGNOSIS — Z12.5 SCREENING FOR PROSTATE CANCER: ICD-10-CM

## 2022-09-22 DIAGNOSIS — E78.00 PURE HYPERCHOLESTEROLEMIA: Primary | ICD-10-CM

## 2022-09-22 NOTE — TELEPHONE ENCOUNTER
Patient is asking if you can enter orders for blood work to be done prior to his AWV next month          Please advise

## 2022-10-05 ENCOUNTER — LAB (OUTPATIENT)
Dept: LAB | Age: 77
End: 2022-10-05
Payer: MEDICARE

## 2022-10-05 DIAGNOSIS — E55.9 VITAMIN D DEFICIENCY: ICD-10-CM

## 2022-10-05 DIAGNOSIS — Z12.5 SCREENING FOR PROSTATE CANCER: ICD-10-CM

## 2022-10-05 DIAGNOSIS — E78.00 PURE HYPERCHOLESTEROLEMIA: ICD-10-CM

## 2022-10-05 LAB
25(OH)D3 SERPL-MCNC: 36.7 NG/ML (ref 30–100)
ALBUMIN SERPL BCP-MCNC: 3.7 G/DL (ref 3.5–5)
ALP SERPL-CCNC: 57 U/L (ref 46–116)
ALT SERPL W P-5'-P-CCNC: 34 U/L (ref 12–78)
ANION GAP SERPL CALCULATED.3IONS-SCNC: 2 MMOL/L (ref 4–13)
AST SERPL W P-5'-P-CCNC: 26 U/L (ref 5–45)
BASOPHILS # BLD AUTO: 0.05 THOUSANDS/ΜL (ref 0–0.1)
BASOPHILS NFR BLD AUTO: 1 % (ref 0–1)
BILIRUB SERPL-MCNC: 0.54 MG/DL (ref 0.2–1)
BUN SERPL-MCNC: 26 MG/DL (ref 5–25)
CALCIUM SERPL-MCNC: 9.5 MG/DL (ref 8.3–10.1)
CHLORIDE SERPL-SCNC: 106 MMOL/L (ref 96–108)
CHOLEST SERPL-MCNC: 176 MG/DL
CO2 SERPL-SCNC: 28 MMOL/L (ref 21–32)
CREAT SERPL-MCNC: 1.29 MG/DL (ref 0.6–1.3)
EOSINOPHIL # BLD AUTO: 0.13 THOUSAND/ΜL (ref 0–0.61)
EOSINOPHIL NFR BLD AUTO: 2 % (ref 0–6)
ERYTHROCYTE [DISTWIDTH] IN BLOOD BY AUTOMATED COUNT: 13.3 % (ref 11.6–15.1)
GFR SERPL CREATININE-BSD FRML MDRD: 53 ML/MIN/1.73SQ M
GLUCOSE P FAST SERPL-MCNC: 97 MG/DL (ref 65–99)
HCT VFR BLD AUTO: 42.7 % (ref 36.5–49.3)
HDLC SERPL-MCNC: 52 MG/DL
HGB BLD-MCNC: 13.9 G/DL (ref 12–17)
IMM GRANULOCYTES # BLD AUTO: 0.01 THOUSAND/UL (ref 0–0.2)
IMM GRANULOCYTES NFR BLD AUTO: 0 % (ref 0–2)
LDLC SERPL CALC-MCNC: 105 MG/DL (ref 0–100)
LYMPHOCYTES # BLD AUTO: 1.66 THOUSANDS/ΜL (ref 0.6–4.47)
LYMPHOCYTES NFR BLD AUTO: 22 % (ref 14–44)
MCH RBC QN AUTO: 30.5 PG (ref 26.8–34.3)
MCHC RBC AUTO-ENTMCNC: 32.6 G/DL (ref 31.4–37.4)
MCV RBC AUTO: 94 FL (ref 82–98)
MONOCYTES # BLD AUTO: 0.58 THOUSAND/ΜL (ref 0.17–1.22)
MONOCYTES NFR BLD AUTO: 8 % (ref 4–12)
NEUTROPHILS # BLD AUTO: 5.12 THOUSANDS/ΜL (ref 1.85–7.62)
NEUTS SEG NFR BLD AUTO: 67 % (ref 43–75)
NRBC BLD AUTO-RTO: 0 /100 WBCS
PLATELET # BLD AUTO: 347 THOUSANDS/UL (ref 149–390)
PMV BLD AUTO: 10.3 FL (ref 8.9–12.7)
POTASSIUM SERPL-SCNC: 4.2 MMOL/L (ref 3.5–5.3)
PROT SERPL-MCNC: 7.9 G/DL (ref 6.4–8.4)
PSA SERPL-MCNC: 4.5 NG/ML (ref 0–4)
RBC # BLD AUTO: 4.55 MILLION/UL (ref 3.88–5.62)
SODIUM SERPL-SCNC: 136 MMOL/L (ref 135–147)
TRIGL SERPL-MCNC: 97 MG/DL
TSH SERPL DL<=0.05 MIU/L-ACNC: 3.14 UIU/ML (ref 0.45–4.5)
WBC # BLD AUTO: 7.55 THOUSAND/UL (ref 4.31–10.16)

## 2022-10-05 PROCEDURE — 84443 ASSAY THYROID STIM HORMONE: CPT

## 2022-10-05 PROCEDURE — 85025 COMPLETE CBC W/AUTO DIFF WBC: CPT

## 2022-10-05 PROCEDURE — G0103 PSA SCREENING: HCPCS

## 2022-10-05 PROCEDURE — 80061 LIPID PANEL: CPT

## 2022-10-05 PROCEDURE — 82306 VITAMIN D 25 HYDROXY: CPT

## 2022-10-05 PROCEDURE — 80053 COMPREHEN METABOLIC PANEL: CPT

## 2022-10-05 PROCEDURE — 36415 COLL VENOUS BLD VENIPUNCTURE: CPT

## 2022-10-06 ENCOUNTER — RA CDI HCC (OUTPATIENT)
Dept: OTHER | Facility: HOSPITAL | Age: 77
End: 2022-10-06

## 2022-10-12 ENCOUNTER — OFFICE VISIT (OUTPATIENT)
Dept: INTERNAL MEDICINE CLINIC | Facility: CLINIC | Age: 77
End: 2022-10-12
Payer: MEDICARE

## 2022-10-12 VITALS
SYSTOLIC BLOOD PRESSURE: 130 MMHG | OXYGEN SATURATION: 99 % | WEIGHT: 222 LBS | BODY MASS INDEX: 29.42 KG/M2 | DIASTOLIC BLOOD PRESSURE: 76 MMHG | HEART RATE: 49 BPM | HEIGHT: 73 IN

## 2022-10-12 DIAGNOSIS — R60.0 EDEMA OF EXTREMITIES: ICD-10-CM

## 2022-10-12 DIAGNOSIS — E78.00 PURE HYPERCHOLESTEROLEMIA: ICD-10-CM

## 2022-10-12 DIAGNOSIS — Z00.00 MEDICARE ANNUAL WELLNESS VISIT, SUBSEQUENT: ICD-10-CM

## 2022-10-12 DIAGNOSIS — N18.30 STAGE 3 CHRONIC KIDNEY DISEASE, UNSPECIFIED WHETHER STAGE 3A OR 3B CKD (HCC): ICD-10-CM

## 2022-10-12 DIAGNOSIS — I10 ESSENTIAL HYPERTENSION: Primary | ICD-10-CM

## 2022-10-12 DIAGNOSIS — R97.20 ELEVATED PSA: ICD-10-CM

## 2022-10-12 DIAGNOSIS — N52.9 ERECTILE DYSFUNCTION, UNSPECIFIED ERECTILE DYSFUNCTION TYPE: ICD-10-CM

## 2022-10-12 PROCEDURE — G0439 PPPS, SUBSEQ VISIT: HCPCS | Performed by: INTERNAL MEDICINE

## 2022-10-12 PROCEDURE — 99214 OFFICE O/P EST MOD 30 MIN: CPT | Performed by: INTERNAL MEDICINE

## 2022-10-12 RX ORDER — SILDENAFIL 50 MG/1
50 TABLET, FILM COATED ORAL DAILY PRN
Qty: 10 TABLET | Refills: 5 | Status: SHIPPED | OUTPATIENT
Start: 2022-10-12

## 2022-10-12 NOTE — PATIENT INSTRUCTIONS
Problem List Items Addressed This Visit          Cardiovascular and Mediastinum    Essential hypertension - Primary      Controlled, continue metoprolol 50 mg daily, and amlodipine 5 mg daily              Genitourinary    CKD (chronic kidney disease) stage 3, GFR 30-59 ml/min ([facility])     Lab Results   Component Value Date    EGFR 53 10/05/2022    EGFR 59 07/13/2021    EGFR 54 11/03/2020    CREATININE 1 29 10/05/2022    CREATININE 1 20 07/13/2021    CREATININE 1 30 11/03/2020    stay hydrated, avoid excessive use of NSAIDs            Other    Pure hypercholesterolemia      Continue pravastatin along with healthy diet and exercise           Medicare annual wellness visit, subsequent     Discussed preventative health, cancer screening, immunizations, and safety issues  Patient up-to-date with colorectal cancer screening done 2016 with recommendations to recheck again in 7 years  Patient up-to-date with all immunizations  Erectile dysfunction    Relevant Medications    sildenafil (VIAGRA) 50 MG tablet    Edema of extremities      Discussed with patient has some skin changes likely related to chronic venous stasis dermatitis, and that he can elevate legs 3 times a day for 30 minutes, try compression stockings, and stay active           Elevated PSA       I recommend evaluation with Urology           Relevant Orders    Ambulatory referral to Urology            Medicare Preventive Visit Patient Instructions  Thank you for completing your Welcome to Medicare Visit or Medicare Annual Wellness Visit today  Your next wellness visit will be due in one year (10/13/2023)  The screening/preventive services that you may require over the next 5-10 years are detailed below  Some tests may not apply to you based off risk factors and/or age  Screening tests ordered at today's visit but not completed yet may show as past due  Also, please note that scanned in results may not display below    Preventive Screenings:  Service Recommendations Previous Testing/Comments   Colorectal Cancer Screening  Colonoscopy    Fecal Occult Blood Test (FOBT)/Fecal Immunochemical Test (FIT)  Fecal DNA/Cologuard Test  Flexible Sigmoidoscopy Age: 39-70 years old   Colonoscopy: every 10 years (May be performed more frequently if at higher risk)  OR  FOBT/FIT: every 1 year  OR  Cologuard: every 3 years  OR  Sigmoidoscopy: every 5 years  Screening may be recommended earlier than age 39 if at higher risk for colorectal cancer  Also, an individualized decision between you and your healthcare provider will decide whether screening between the ages of 74-80 would be appropriate  Colonoscopy: 08/17/2016  FOBT/FIT: Not on file  Cologuard: Not on file  Sigmoidoscopy: Not on file          Prostate Cancer Screening Individualized decision between patient and health care provider in men between ages of 53-78   Medicare will cover every 12 months beginning on the day after your 50th birthday PSA: 4 5 ng/mL           Hepatitis C Screening Once for adults born between 1945 and 1965  More frequently in patients at high risk for Hepatitis C Hep C Antibody: 09/08/2017        Diabetes Screening 1-2 times per year if you're at risk for diabetes or have pre-diabetes Fasting glucose: 97 mg/dL (10/5/2022)  A1C: 5 3 % (10/30/2020)      Cholesterol Screening Once every 5 years if you don't have a lipid disorder  May order more often based on risk factors  Lipid panel: 10/05/2022         Other Preventive Screenings Covered by Medicare:  Abdominal Aortic Aneurysm (AAA) Screening: covered once if your at risk  You're considered to be at risk if you have a family history of AAA or a male between the age of 73-68 who smoking at least 100 cigarettes in your lifetime    Lung Cancer Screening: covers low dose CT scan once per year if you meet all of the following conditions: (1) Age 50-69; (2) No signs or symptoms of lung cancer; (3) Current smoker or have quit smoking within the last 15 years; (4) You have a tobacco smoking history of at least 20 pack years (packs per day x number of years you smoked); (5) You get a written order from a healthcare provider  Glaucoma Screening: covered annually if you're considered high risk: (1) You have diabetes OR (2) Family history of glaucoma OR (3)  aged 48 and older OR (3)  American aged 72 and older  Osteoporosis Screening: covered every 2 years if you meet one of the following conditions: (1) Have a vertebral abnormality; (2) On glucocorticoid therapy for more than 3 months; (3) Have primary hyperparathyroidism; (4) On osteoporosis medications and need to assess response to drug therapy  HIV Screening: covered annually if you're between the age of 12-76  Also covered annually if you are younger than 13 and older than 72 with risk factors for HIV infection  For pregnant patients, it is covered up to 3 times per pregnancy  Immunizations:  Immunization Recommendations   Influenza Vaccine Annual influenza vaccination during flu season is recommended for all persons aged >= 6 months who do not have contraindications   Pneumococcal Vaccine   * Pneumococcal conjugate vaccine = PCV13 (Prevnar 13), PCV15 (Vaxneuvance), PCV20 (Prevnar 20)  * Pneumococcal polysaccharide vaccine = PPSV23 (Pneumovax) Adults 25-60 years old: 1-3 doses may be recommended based on certain risk factors  Adults 72 years old: 1-2 doses may be recommended based off what pneumonia vaccine you previously received   Hepatitis B Vaccine 3 dose series if at intermediate or high risk (ex: diabetes, end stage renal disease, liver disease)   Tetanus (Td) Vaccine - COST NOT COVERED BY MEDICARE PART B Following completion of primary series, a booster dose should be given every 10 years to maintain immunity against tetanus  Td may also be given as tetanus wound prophylaxis  Tdap Vaccine - COST NOT COVERED BY MEDICARE PART B Recommended at least once for all adults   For pregnant patients, recommended with each pregnancy  Shingles Vaccine (Shingrix) - COST NOT COVERED BY MEDICARE PART B  2 shot series recommended in those aged 48 and above     Health Maintenance Due:      Topic Date Due    Hepatitis C Screening  Completed     Immunizations Due:  There are no preventive care reminders to display for this patient  Advance Directives   What are advance directives? Advance directives are legal documents that state your wishes and plans for medical care  These plans are made ahead of time in case you lose your ability to make decisions for yourself  Advance directives can apply to any medical decision, such as the treatments you want, and if you want to donate organs  What are the types of advance directives? There are many types of advance directives, and each state has rules about how to use them  You may choose a combination of any of the following:  Living will: This is a written record of the treatment you want  You can also choose which treatments you do not want, which to limit, and which to stop at a certain time  This includes surgery, medicine, IV fluid, and tube feedings  Durable power of  for healthcare [facility]): This is a written record that states who you want to make healthcare choices for you when you are unable to make them for yourself  This person, called a proxy, is usually a family member or a friend  You may choose more than 1 proxy  Do not resuscitate (DNR) order:  A DNR order is used in case your heart stops beating or you stop breathing  It is a request not to have certain forms of treatment, such as CPR  A DNR order may be included in other types of advance directives  Medical directive: This covers the care that you want if you are in a coma, near death, or unable to make decisions for yourself  You can list the treatments you want for each condition   Treatment may include pain medicine, surgery, blood transfusions, dialysis, IV or tube feedings, and a ventilator (breathing machine)  Values history: This document has questions about your views, beliefs, and how you feel and think about life  This information can help others choose the care that you would choose  Why are advance directives important? An advance directive helps you control your care  Although spoken wishes may be used, it is better to have your wishes written down  Spoken wishes can be misunderstood, or not followed  Treatments may be given even if you do not want them  An advance directive may make it easier for your family to make difficult choices about your care  © Copyright [employer] 2018 Information is for End User's use only and may not be sold, redistributed or otherwise used for commercial purposes   All illustrations and images included in CareNotes® are the copyrighted property of A [employer] M , Inc  or [address] [place], [place] [address]omid [employer]

## 2022-10-12 NOTE — PROGRESS NOTES
Assessment and Plan:     Problem List Items Addressed This Visit        Cardiovascular and Mediastinum    Essential hypertension - Primary      Controlled, continue metoprolol 50 mg daily, and amlodipine 5 mg daily            Genitourinary    CKD (chronic kidney disease) stage 3, GFR 30-59 ml/min ([facility])     Lab Results   Component Value Date    EGFR 53 10/05/2022    EGFR 59 07/13/2021    EGFR 54 11/03/2020    CREATININE 1 29 10/05/2022    CREATININE 1 20 07/13/2021    CREATININE 1 30 11/03/2020    stay hydrated, avoid excessive use of NSAIDs            Other    Pure hypercholesterolemia      Continue pravastatin along with healthy diet and exercise         Medicare annual wellness visit, subsequent     Discussed preventative health, cancer screening, immunizations, and safety issues  Patient up-to-date with colorectal cancer screening done 2016 with recommendations to recheck again in 7 years  Patient up-to-date with all immunizations  Erectile dysfunction    Relevant Medications    sildenafil (VIAGRA) 50 MG tablet    Edema of extremities      Discussed with patient has some skin changes likely related to chronic venous stasis dermatitis, and that he can elevate legs 3 times a day for 30 minutes, try compression stockings, and stay active         Elevated PSA       I recommend evaluation with Urology         Relevant Orders    Ambulatory referral to Urology        BMI Counseling: Body mass index is 29 29 kg/m²  The BMI is above normal  Nutrition recommendations include encouraging healthy choices of fruits and vegetables and moderation in carbohydrate intake  Exercise recommendations include exercising 3-5 times per week  Rationale for BMI follow-up plan is due to patient being overweight or obese  Preventive health issues were discussed with patient, and age appropriate screening tests were ordered as noted in patient's After Visit Summary    Personalized health advice and appropriate referrals for health education or preventive services given if needed, as noted in patient's After Visit Summary  History of Present Illness:     Patient presents for a Medicare Wellness Visit      Patient here for annual wellness visit and follow-up of chronic conditions  Patient Care Team:  Susy Slade MD as PCP - General     Review of Systems:     Review of Systems   Constitutional: Negative for chills, fatigue and fever  HENT: Negative for congestion, nosebleeds, postnasal drip, sore throat and trouble swallowing  Eyes: Negative for pain  Respiratory: Negative for cough, chest tightness, shortness of breath and wheezing  Cardiovascular: Negative for chest pain, palpitations and leg swelling  Gastrointestinal: Negative for abdominal pain, constipation, diarrhea, nausea and vomiting  Endocrine: Negative for polydipsia and polyuria  Genitourinary: Negative for dysuria, flank pain and hematuria  Musculoskeletal: Negative for arthralgias, back pain and myalgias  Skin: Negative for rash  Neurological: Negative for dizziness, tremors, light-headedness and headaches  Hematological: Does not bruise/bleed easily  Psychiatric/Behavioral: Negative for confusion and dysphoric mood  The patient is not nervous/anxious           Problem List:     Patient Active Problem List   Diagnosis   • Impingement syndrome of right shoulder   • Essential hypertension   • Pure hypercholesterolemia   • Pain in right hip   • Trochanteric bursitis of right hip   • Medicare annual wellness visit, subsequent   • Left hand pain   • Near syncope   • AAA (abdominal aortic aneurysm)   • Elevated serum creatinine   • CKD (chronic kidney disease) stage 3, GFR 30-59 ml/min ([facility])   • Trigger middle finger of left hand   • Erectile dysfunction   • Edema of extremities   • SOB (shortness of breath) on exertion   • Elevated PSA      Past Medical and Surgical History:     Past Medical History:   Diagnosis Date   • Hypertension    • Thyroid nodule      Past Surgical History:   Procedure Laterality Date   • BACK SURGERY     • KNEE ARTHROSCOPY     • CA INCISE FINGER TENDON SHEATH Left 2020    Procedure: LONG TRIGGER FINGER RELEASE;  Surgeon: Clay Roach MD;  Location: [facility];  Service: Orthopedics   • CA KNEE SCOPE,MED/LAT MENISECTOMY Left 2017    Procedure: KNEE ARTHROSCOPY; PARTIAL MEDIAL MENISCECTOMY;  Surgeon: Adina Aguilar MD;  Location: [facility];  Service: Orthopedics      Family History:     Family History   Problem Relation Age of Onset   • Cancer Mother       Social History:     Social History     Socioeconomic History   • Marital status: /Civil Union     Spouse name: None   • Number of children: None   • Years of education: None   • Highest education level: None   Occupational History   • Occupation: Retired    Tobacco Use   • Smoking status: Former Smoker     Quit date:      Years since quittin 8   • Smokeless tobacco: Never Used   Vaping Use   • Vaping Use: Never used   Substance and Sexual Activity   • Alcohol use: No     Comment: Social alcohol use - As per [employer]    • Drug use: No   • Sexual activity: None   Other Topics Concern   • None   Social History Narrative    Advance directive on file      Social Determinants of Health     Financial Resource Strain: Low Risk    • Difficulty of Paying Living Expenses: Not hard at all   Food Insecurity: Not on file   Transportation Needs: No Transportation Needs   • Lack of Transportation (Medical): No   • Lack of Transportation (Non-Medical):  No   Physical Activity: Not on file   Stress: Not on file   Social Connections: Not on file   Intimate Partner Violence: Not on file   Housing Stability: Not on file      Medications and Allergies:     Current Outpatient Medications   Medication Sig Dispense Refill   • amLODIPine (NORVASC) 5 mg tablet TAKE ONE TABLET BY MOUTH EVERY DAY 90 tablet 3   • clotrimazole-betamethasone (LOTRISONE) 1-0 05 % cream KATEY 2 GRAMS EXT AA BID  2   • fluocinonide (LIDEX) 0 05 % cream      • fluorouracil (EFUDEX) 5 % cream APPLY TO NOSE BID X 3 WEEKS     • metoprolol succinate (TOPROL-XL) 50 mg 24 hr tablet TAKE ONE TABLET BY MOUTH EVERY DAY 90 tablet 3   • pravastatin (PRAVACHOL) 10 mg tablet TAKE ONE TABLET BY MOUTH AT BEDTIME 90 tablet 3   • sildenafil (VIAGRA) 50 MG tablet Take 1 tablet (50 mg total) by mouth daily as needed for erectile dysfunction 10 tablet 5   • meclizine (ANTIVERT) 12 5 MG tablet Take 1 tablet (12 5 mg total) by mouth every 8 (eight) hours as needed for dizziness for up to 3 days 9 tablet 0     No current facility-administered medications for this visit  No Known Allergies   Immunizations:     Immunization History   Administered Date(s) Administered   • COVID-19 MODERNA VACC 0 5 ML IM 01/27/2021, 02/23/2021, 10/29/2021, 08/04/2022   • Hep A, adult 04/26/2017, 10/04/2017   • INFLUENZA 09/22/2014, 09/05/2015, 09/09/2016, 09/21/2017, 09/10/2018, 09/22/2019, 09/24/2021, 09/15/2022   • Influenza Split High Dose Preservative Free IM 09/21/2017, 09/10/2018, 09/22/2019   • Pneumococcal Conjugate 13-Valent 10/11/2016   • Pneumococcal Polysaccharide PPV23 08/29/2019   • Tdap 08/20/2019   • Zoster 04/12/2018   • Zoster Vaccine Recombinant 04/12/2018, 09/01/2018   • influenza, trivalent, adjuvanted 09/22/2019      Health Maintenance:         Topic Date Due   • Hepatitis C Screening  Completed     There are no preventive care reminders to display for this patient  Medicare Screening Tests and Risk Assessments:     Miles Yan is here for his Subsequent Wellness visit  Health Risk Assessment:   Patient rates overall health as good  Patient feels that their physical health rating is same  Patient is satisfied with their life  Eyesight was rated as same  Hearing was rated as same  Patient feels that their emotional and mental health rating is same  Patients states they are sometimes angry   Patient states they are sometimes unusually tired/fatigued  Pain experienced in the last 7 days has been none  Patient states that he has experienced no weight loss or gain in last 6 months  Fall Risk Screening: In the past year, patient has experienced: no history of falling in past year      Home Safety:  Patient does not have trouble with stairs inside or outside of their home  Patient has working smoke alarms and has working carbon monoxide detector  Home safety hazards include: none  Nutrition:   Current diet is Regular  Medications:   Patient is currently taking over-the-counter supplements  OTC medications include: Vitamin D  Patient is able to manage medications  Activities of Daily Living (ADLs)/Instrumental Activities of Daily Living (IADLs):   Walk and transfer into and out of bed and chair?: Yes  Dress and groom yourself?: Yes    Bathe or shower yourself?: Yes    Feed yourself? Yes  Do your laundry/housekeeping?: Yes  Manage your money, pay your bills and track your expenses?: Yes  Make your own meals?: Yes    Do your own shopping?: Yes    Previous Hospitalizations:   Any hospitalizations or ED visits within the last 12 months?: No      Advance Care Planning:   Living will: Yes    Durable POA for healthcare:  Yes    Advanced directive: Yes      Cognitive Screening:   Provider or family/friend/caregiver concerned regarding cognition?: No    PREVENTIVE SCREENINGS      Cardiovascular Screening:    General: Screening Not Indicated, History Lipid Disorder, Risks and Benefits Discussed and Screening Current      Diabetes Screening:     General: Screening Current and Risks and Benefits Discussed      Colorectal Cancer Screening:     General: Risks and Benefits Discussed and Screening Current      Prostate Cancer Screening:    General: Screening Not Indicated, Risks and Benefits Discussed and Screening Current      Abdominal Aortic Aneurysm (AAA) Screening:    Risk factors include: tobacco use        General: Screening Not Indicated and History AAA      Lung Cancer Screening:     General: Screening Not Indicated      Hepatitis C Screening:    General: Screening Current    Screening, Brief Intervention, and Referral to Treatment (SBIRT)    Screening  Typical number of drinks in a day: 0  Typical number of drinks in a week: 0  Interpretation: Low risk drinking behavior  AUDIT-C Screenin) How often did you have a drink containing alcohol in the past year? never  2) How many drinks did you have on a typical day when you were drinking in the past year? 0  3) How often did you have 6 or more drinks on one occasion in the past year? never    AUDIT-C Score: 0  Interpretation: Score 0-3 (male): Negative screen for alcohol misuse    Single Item Drug Screening:  How often have you used an illegal drug (including marijuana) or a prescription medication for non-medical reasons in the past year? never    Single Item Drug Screen Score: 0  Interpretation: Negative screen for possible drug use disorder    Brief Intervention  Alcohol & drug use screenings were reviewed  No concerns regarding substance use disorder identified  Other Counseling Topics:   Car/seat belt/driving safety, skin self-exam and sunscreen  No exam data present     Physical Exam:     /76 (BP Location: Left arm, Patient Position: Sitting, Cuff Size: Standard)   Pulse (!) 49   Ht 6' 1" (1 854 m)   Wt 101 kg (222 lb)   SpO2 99%   BMI 29 29 kg/m²     Physical Exam  Vitals reviewed  Constitutional:       General: He is not in acute distress  Appearance: Normal appearance  He is well-developed  HENT:      Head: Normocephalic and atraumatic  Right Ear: External ear normal       Left Ear: External ear normal       Nose: Nose normal    Eyes:      General: No scleral icterus  Conjunctiva/sclera: Conjunctivae normal    Neck:      Trachea: No tracheal deviation  Cardiovascular:      Rate and Rhythm: Normal rate and regular rhythm        Heart sounds: Normal heart sounds  No murmur heard  Pulmonary:      Effort: Pulmonary effort is normal  No respiratory distress  Breath sounds: Normal breath sounds  No wheezing or rales  Abdominal:      General: Bowel sounds are normal       Palpations: Abdomen is soft  There is no mass  Tenderness: There is no abdominal tenderness  There is no guarding  Musculoskeletal:      Cervical back: Normal range of motion and neck supple  Right lower leg: Edema (trace) present  Left lower leg: Edema (trace) present  Lymphadenopathy:      Cervical: No cervical adenopathy  Skin:     Coloration: Skin is not jaundiced or pale  Neurological:      General: No focal deficit present  Mental Status: He is alert and oriented to person, place, and time  Psychiatric:         Mood and Affect: Mood normal          Behavior: Behavior normal          Thought Content:  Thought content normal          Judgment: Judgment normal           Ruby Youssef MD

## 2022-10-12 NOTE — ASSESSMENT & PLAN NOTE
Lab Results   Component Value Date    EGFR 53 10/05/2022    EGFR 59 07/13/2021    EGFR 54 11/03/2020    CREATININE 1 29 10/05/2022    CREATININE 1 20 07/13/2021    CREATININE 1 30 11/03/2020    stay hydrated, avoid excessive use of NSAIDs

## 2022-10-12 NOTE — ASSESSMENT & PLAN NOTE
Discussed with patient has some skin changes likely related to chronic venous stasis dermatitis, and that he can elevate legs 3 times a day for 30 minutes, try compression stockings, and stay active

## 2022-10-12 NOTE — ASSESSMENT & PLAN NOTE
Discussed preventative health, cancer screening, immunizations, and safety issues  Patient up-to-date with colorectal cancer screening done 2016 with recommendations to recheck again in 7 years  Patient up-to-date with all immunizations

## 2022-12-07 DIAGNOSIS — E78.00 PURE HYPERCHOLESTEROLEMIA: ICD-10-CM

## 2022-12-07 RX ORDER — PRAVASTATIN SODIUM 10 MG
TABLET ORAL
Qty: 90 TABLET | Refills: 3 | Status: SHIPPED | OUTPATIENT
Start: 2022-12-07

## 2023-07-26 DIAGNOSIS — I10 ESSENTIAL HYPERTENSION: ICD-10-CM

## 2023-07-26 RX ORDER — METOPROLOL SUCCINATE 50 MG/1
TABLET, EXTENDED RELEASE ORAL
Qty: 90 TABLET | Refills: 3 | Status: SHIPPED | OUTPATIENT
Start: 2023-07-26

## 2023-08-29 DIAGNOSIS — I10 ESSENTIAL HYPERTENSION: ICD-10-CM

## 2023-08-29 RX ORDER — AMLODIPINE BESYLATE 5 MG/1
TABLET ORAL
Qty: 90 TABLET | Refills: 0 | Status: SHIPPED | OUTPATIENT
Start: 2023-08-29

## 2023-09-21 ENCOUNTER — TELEPHONE (OUTPATIENT)
Age: 78
End: 2023-09-21

## 2023-09-21 NOTE — TELEPHONE ENCOUNTER
Patient requesting the followin) RSV vaccine script put in. He will be getting it at [address] [facility]. 2) Labs so he could get completed for his OV on 10/19. Please f/u with patient. He would like a heads up when these are put in and aviailable to him.     Thank You

## 2023-09-22 DIAGNOSIS — E55.9 VITAMIN D DEFICIENCY: ICD-10-CM

## 2023-09-22 DIAGNOSIS — Z12.5 SCREENING FOR PROSTATE CANCER: ICD-10-CM

## 2023-09-22 DIAGNOSIS — E78.00 PURE HYPERCHOLESTEROLEMIA: Primary | ICD-10-CM

## 2023-09-22 NOTE — TELEPHONE ENCOUNTER
Done, let pt know. Labs ordered. Also: Please check with pharmacy to see if they can take a verbal for the RSV vaccine. There is no way in the electronic health record system to order a the RSV vaccine to get at the pharmacy. When I put in the order, it is only for the patient to get here, and we do not have the vaccine here. Let pt know the outcome.   It is my understanding that people can get this vaccine at a pharmacy without a prescription

## 2023-09-22 NOTE — TELEPHONE ENCOUNTER
Done, let pt know.   Prescription put in fax area, can fax to [facility] or have patient , let them know

## 2023-09-22 NOTE — TELEPHONE ENCOUNTER
No they need a written order - ask Dr. Deepak Bass as she had to write one for someone the other day

## 2023-10-30 ENCOUNTER — LAB (OUTPATIENT)
Dept: LAB | Age: 78
End: 2023-10-30
Payer: MEDICARE

## 2023-10-30 DIAGNOSIS — E78.00 PURE HYPERCHOLESTEROLEMIA: ICD-10-CM

## 2023-10-30 DIAGNOSIS — E55.9 VITAMIN D DEFICIENCY: ICD-10-CM

## 2023-10-30 DIAGNOSIS — Z12.5 SCREENING FOR PROSTATE CANCER: ICD-10-CM

## 2023-10-30 LAB
25(OH)D3 SERPL-MCNC: 39.2 NG/ML (ref 30–100)
ALBUMIN SERPL BCP-MCNC: 4.4 G/DL (ref 3.5–5)
ALP SERPL-CCNC: 46 U/L (ref 34–104)
ALT SERPL W P-5'-P-CCNC: 20 U/L (ref 7–52)
ANION GAP SERPL CALCULATED.3IONS-SCNC: 6 MMOL/L
AST SERPL W P-5'-P-CCNC: 21 U/L (ref 13–39)
BASOPHILS # BLD AUTO: 0.07 THOUSANDS/ÂΜL (ref 0–0.1)
BASOPHILS NFR BLD AUTO: 1 % (ref 0–1)
BILIRUB SERPL-MCNC: 0.5 MG/DL (ref 0.2–1)
BUN SERPL-MCNC: 22 MG/DL (ref 5–25)
CALCIUM SERPL-MCNC: 9.1 MG/DL (ref 8.4–10.2)
CHLORIDE SERPL-SCNC: 103 MMOL/L (ref 96–108)
CHOLEST SERPL-MCNC: 189 MG/DL
CO2 SERPL-SCNC: 29 MMOL/L (ref 21–32)
CREAT SERPL-MCNC: 1.25 MG/DL (ref 0.6–1.3)
EOSINOPHIL # BLD AUTO: 0.18 THOUSAND/ÂΜL (ref 0–0.61)
EOSINOPHIL NFR BLD AUTO: 3 % (ref 0–6)
ERYTHROCYTE [DISTWIDTH] IN BLOOD BY AUTOMATED COUNT: 13.5 % (ref 11.6–15.1)
GFR SERPL CREATININE-BSD FRML MDRD: 55 ML/MIN/1.73SQ M
GLUCOSE P FAST SERPL-MCNC: 90 MG/DL (ref 65–99)
HCT VFR BLD AUTO: 44.6 % (ref 36.5–49.3)
HDLC SERPL-MCNC: 55 MG/DL
HGB BLD-MCNC: 14.7 G/DL (ref 12–17)
IMM GRANULOCYTES # BLD AUTO: 0.01 THOUSAND/UL (ref 0–0.2)
IMM GRANULOCYTES NFR BLD AUTO: 0 % (ref 0–2)
LDLC SERPL CALC-MCNC: 110 MG/DL (ref 0–100)
LYMPHOCYTES # BLD AUTO: 1.65 THOUSANDS/ÂΜL (ref 0.6–4.47)
LYMPHOCYTES NFR BLD AUTO: 26 % (ref 14–44)
MCH RBC QN AUTO: 31.6 PG (ref 26.8–34.3)
MCHC RBC AUTO-ENTMCNC: 33 G/DL (ref 31.4–37.4)
MCV RBC AUTO: 96 FL (ref 82–98)
MONOCYTES # BLD AUTO: 0.47 THOUSAND/ÂΜL (ref 0.17–1.22)
MONOCYTES NFR BLD AUTO: 8 % (ref 4–12)
NEUTROPHILS # BLD AUTO: 3.9 THOUSANDS/ÂΜL (ref 1.85–7.62)
NEUTS SEG NFR BLD AUTO: 62 % (ref 43–75)
NRBC BLD AUTO-RTO: 0 /100 WBCS
PLATELET # BLD AUTO: 338 THOUSANDS/UL (ref 149–390)
PMV BLD AUTO: 10.5 FL (ref 8.9–12.7)
POTASSIUM SERPL-SCNC: 4.1 MMOL/L (ref 3.5–5.3)
PROT SERPL-MCNC: 7.6 G/DL (ref 6.4–8.4)
PSA SERPL-MCNC: 4.33 NG/ML (ref 0–4)
RBC # BLD AUTO: 4.65 MILLION/UL (ref 3.88–5.62)
SODIUM SERPL-SCNC: 138 MMOL/L (ref 135–147)
TRIGL SERPL-MCNC: 121 MG/DL
TSH SERPL DL<=0.05 MIU/L-ACNC: 4.25 UIU/ML (ref 0.45–4.5)
WBC # BLD AUTO: 6.28 THOUSAND/UL (ref 4.31–10.16)

## 2023-10-30 PROCEDURE — 36415 COLL VENOUS BLD VENIPUNCTURE: CPT

## 2023-10-30 PROCEDURE — 80053 COMPREHEN METABOLIC PANEL: CPT

## 2023-10-30 PROCEDURE — 85025 COMPLETE CBC W/AUTO DIFF WBC: CPT

## 2023-10-30 PROCEDURE — G0103 PSA SCREENING: HCPCS

## 2023-10-30 PROCEDURE — 82306 VITAMIN D 25 HYDROXY: CPT

## 2023-10-30 PROCEDURE — 84443 ASSAY THYROID STIM HORMONE: CPT

## 2023-10-30 PROCEDURE — 80061 LIPID PANEL: CPT

## 2023-10-31 ENCOUNTER — RA CDI HCC (OUTPATIENT)
Dept: OTHER | Facility: HOSPITAL | Age: 78
End: 2023-10-31

## 2023-11-06 ENCOUNTER — OFFICE VISIT (OUTPATIENT)
Dept: INTERNAL MEDICINE CLINIC | Facility: CLINIC | Age: 78
End: 2023-11-06
Payer: MEDICARE

## 2023-11-06 VITALS
SYSTOLIC BLOOD PRESSURE: 136 MMHG | HEIGHT: 73 IN | WEIGHT: 222.8 LBS | DIASTOLIC BLOOD PRESSURE: 72 MMHG | TEMPERATURE: 98 F | HEART RATE: 76 BPM | OXYGEN SATURATION: 100 % | BODY MASS INDEX: 29.53 KG/M2

## 2023-11-06 DIAGNOSIS — N18.30 STAGE 3 CHRONIC KIDNEY DISEASE, UNSPECIFIED WHETHER STAGE 3A OR 3B CKD (HCC): ICD-10-CM

## 2023-11-06 DIAGNOSIS — R97.20 ELEVATED PSA: ICD-10-CM

## 2023-11-06 DIAGNOSIS — Z00.00 MEDICARE ANNUAL WELLNESS VISIT, SUBSEQUENT: ICD-10-CM

## 2023-11-06 DIAGNOSIS — R60.0 EDEMA OF EXTREMITIES: ICD-10-CM

## 2023-11-06 DIAGNOSIS — I10 ESSENTIAL HYPERTENSION: Primary | ICD-10-CM

## 2023-11-06 DIAGNOSIS — I71.40 ABDOMINAL AORTIC ANEURYSM (AAA) WITHOUT RUPTURE, UNSPECIFIED PART (HCC): ICD-10-CM

## 2023-11-06 DIAGNOSIS — E78.00 PURE HYPERCHOLESTEROLEMIA: ICD-10-CM

## 2023-11-06 PROCEDURE — 99214 OFFICE O/P EST MOD 30 MIN: CPT | Performed by: INTERNAL MEDICINE

## 2023-11-06 PROCEDURE — G0439 PPPS, SUBSEQ VISIT: HCPCS | Performed by: INTERNAL MEDICINE

## 2023-11-06 RX ORDER — METOPROLOL SUCCINATE 50 MG/1
50 TABLET, EXTENDED RELEASE ORAL DAILY
Qty: 90 TABLET | Refills: 3 | Status: SHIPPED | OUTPATIENT
Start: 2023-11-06

## 2023-11-06 RX ORDER — PRAVASTATIN SODIUM 10 MG
10 TABLET ORAL
Qty: 90 TABLET | Refills: 3 | Status: SHIPPED | OUTPATIENT
Start: 2023-11-06

## 2023-11-06 RX ORDER — AMLODIPINE BESYLATE 5 MG/1
5 TABLET ORAL DAILY
Qty: 90 TABLET | Refills: 3 | Status: SHIPPED | OUTPATIENT
Start: 2023-11-06

## 2023-11-06 NOTE — PATIENT INSTRUCTIONS
Problem List Items Addressed This Visit          Cardiovascular and Mediastinum    Essential hypertension - Primary     Controlled, continue amlodipine 5 mg daily and metoprolol 50 mg daily         Relevant Medications    metoprolol succinate (TOPROL-XL) 50 mg 24 hr tablet    amLODIPine (NORVASC) 5 mg tablet    AAA (abdominal aortic aneurysm) ([facility])     Reminded about rechecking ultrasound         Relevant Orders    US abdominal aorta       Genitourinary    CKD (chronic kidney disease) stage 3, GFR 30-59 ml/min ([facility])     Lab Results   Component Value Date    EGFR 55 10/30/2023    EGFR 53 10/05/2022    EGFR 59 07/13/2021    CREATININE 1.25 10/30/2023    CREATININE 1.29 10/05/2022    CREATININE 1.20 07/13/2021   Stable, avoid excessive use of NSAIDs, stay hydrated            Other    Pure hypercholesterolemia     Continue pravastatin along with healthy diet         Relevant Medications    pravastatin (PRAVACHOL) 10 mg tablet    Medicare annual wellness visit, subsequent     Discussed preventative health, cancer screening, immunizations, and safety issues. Last colonoscopy 2016 with recommendations to recheck again in 7 years. Patient had the flu shot this season. Patient had RSV vaccine. Patient had the Shingrix vaccines. Patient had the pneumonia shots. Edema of extremities     No significant problems currently. Elevated PSA     Patient chose not to see urology            Medicare Preventive Visit Patient Instructions  Thank you for completing your Welcome to Medicare Visit or Medicare Annual Wellness Visit today. Your next wellness visit will be due in one year (11/6/2024). The screening/preventive services that you may require over the next 5-10 years are detailed below. Some tests may not apply to you based off risk factors and/or age. Screening tests ordered at today's visit but not completed yet may show as past due.  Also, please note that scanned in results may not display below.  Preventive Screenings:  Service Recommendations Previous Testing/Comments   Colorectal Cancer Screening  Colonoscopy    Fecal Occult Blood Test (FOBT)/Fecal Immunochemical Test (FIT)  Fecal DNA/Cologuard Test  Flexible Sigmoidoscopy Age: 43-73 years old   Colonoscopy: every 10 years (May be performed more frequently if at higher risk)  OR  FOBT/FIT: every 1 year  OR  Cologuard: every 3 years  OR  Sigmoidoscopy: every 5 years  Screening may be recommended earlier than age 39 if at higher risk for colorectal cancer. Also, an individualized decision between you and your healthcare provider will decide whether screening between the ages of 77-80 would be appropriate. Colonoscopy: 08/17/2016  FOBT/FIT: Not on file  Cologuard: Not on file  Sigmoidoscopy: Not on file          Prostate Cancer Screening Individualized decision between patient and health care provider in men between ages of 53-66   Medicare will cover every 12 months beginning on the day after your 50th birthday PSA: 4.33 ng/mL           Hepatitis C Screening Once for adults born between 1945 and 1965  More frequently in patients at high risk for Hepatitis C Hep C Antibody: 09/08/2017        Diabetes Screening 1-2 times per year if you're at risk for diabetes or have pre-diabetes Fasting glucose: 90 mg/dL (10/30/2023)  A1C: 5.3 % (10/30/2020)      Cholesterol Screening Once every 5 years if you don't have a lipid disorder. May order more often based on risk factors. Lipid panel: 10/30/2023         Other Preventive Screenings Covered by Medicare:  Abdominal Aortic Aneurysm (AAA) Screening: covered once if your at risk. You're considered to be at risk if you have a family history of AAA or a male between the age of 70-76 who smoking at least 100 cigarettes in your lifetime.   Lung Cancer Screening: covers low dose CT scan once per year if you meet all of the following conditions: (1) Age 48-67; (2) No signs or symptoms of lung cancer; (3) Current smoker or have quit smoking within the last 15 years; (4) You have a tobacco smoking history of at least 20 pack years (packs per day x number of years you smoked); (5) You get a written order from a healthcare provider. Glaucoma Screening: covered annually if you're considered high risk: (1) You have diabetes OR (2) Family history of glaucoma OR (3)  aged 48 and older OR (3)  American aged 72 and older  Osteoporosis Screening: covered every 2 years if you meet one of the following conditions: (1) Have a vertebral abnormality; (2) On glucocorticoid therapy for more than 3 months; (3) Have primary hyperparathyroidism; (4) On osteoporosis medications and need to assess response to drug therapy. HIV Screening: covered annually if you're between the age of 14-79. Also covered annually if you are younger than 13 and older than 72 with risk factors for HIV infection. For pregnant patients, it is covered up to 3 times per pregnancy.     Immunizations:  Immunization Recommendations   Influenza Vaccine Annual influenza vaccination during flu season is recommended for all persons aged >= 6 months who do not have contraindications   Pneumococcal Vaccine   * Pneumococcal conjugate vaccine = PCV13 (Prevnar 13), PCV15 (Vaxneuvance), PCV20 (Prevnar 20)  * Pneumococcal polysaccharide vaccine = PPSV23 (Pneumovax) Adults 18-31 yo with certain risk factors or if 69+ yo  If never received any pneumonia vaccine: recommend Prevnar 20 (PCV20)  Give PCV20 if previously received 1 dose of PCV13 or PPSV23   Hepatitis B Vaccine 3 dose series if at intermediate or high risk (ex: diabetes, end stage renal disease, liver disease)   Respiratory syncytial virus (RSV) Vaccine - COVERED BY MEDICARE PART D  * RSVPreF3 (Arexvy) CDC recommends that adults 61years of age and older may receive a single dose of RSV vaccine using shared clinical decision-making (SCDM)   Tetanus (Td) Vaccine - COST NOT COVERED BY MEDICARE PART B Following completion of primary series, a booster dose should be given every 10 years to maintain immunity against tetanus. Td may also be given as tetanus wound prophylaxis. Tdap Vaccine - COST NOT COVERED BY MEDICARE PART B Recommended at least once for all adults. For pregnant patients, recommended with each pregnancy. Shingles Vaccine (Shingrix) - COST NOT COVERED BY MEDICARE PART B  2 shot series recommended in those 19 years and older who have or will have weakened immune systems or those 50 years and older     Health Maintenance Due:      Topic Date Due    Hepatitis C Screening  Completed    Colorectal Cancer Screening  Discontinued     Immunizations Due:      Topic Date Due    COVID-19 Vaccine (5 - Moderna series) 09/29/2022     Advance Directives   What are advance directives? Advance directives are legal documents that state your wishes and plans for medical care. These plans are made ahead of time in case you lose your ability to make decisions for yourself. Advance directives can apply to any medical decision, such as the treatments you want, and if you want to donate organs. What are the types of advance directives? There are many types of advance directives, and each state has rules about how to use them. You may choose a combination of any of the following:  Living will: This is a written record of the treatment you want. You can also choose which treatments you do not want, which to limit, and which to stop at a certain time. This includes surgery, medicine, IV fluid, and tube feedings. Durable power of  for healthcare [facility]): This is a written record that states who you want to make healthcare choices for you when you are unable to make them for yourself. This person, called a proxy, is usually a family member or a friend. You may choose more than 1 proxy. Do not resuscitate (DNR) order:  A DNR order is used in case your heart stops beating or you stop breathing.  It is a request not to have certain forms of treatment, such as CPR. A DNR order may be included in other types of advance directives. Medical directive: This covers the care that you want if you are in a coma, near death, or unable to make decisions for yourself. You can list the treatments you want for each condition. Treatment may include pain medicine, surgery, blood transfusions, dialysis, IV or tube feedings, and a ventilator (breathing machine). Values history: This document has questions about your views, beliefs, and how you feel and think about life. This information can help others choose the care that you would choose. Why are advance directives important? An advance directive helps you control your care. Although spoken wishes may be used, it is better to have your wishes written down. Spoken wishes can be misunderstood, or not followed. Treatments may be given even if you do not want them. An advance directive may make it easier for your family to make difficult choices about your care. © Copyright [employer] 2018 Information is for End User's use only and may not be sold, redistributed or otherwise used for commercial purposes.  All illustrations and images included in CareNotes® are the copyrighted property of A.D.A.M., Inc. or  [address]

## 2023-11-06 NOTE — ASSESSMENT & PLAN NOTE
Discussed preventative health, cancer screening, immunizations, and safety issues. Last colonoscopy 2016 with recommendations to recheck again in 7 years. Patient had the flu shot this season. Patient had RSV vaccine. Patient had the Shingrix vaccines. Patient had the pneumonia shots.

## 2023-11-06 NOTE — PROGRESS NOTES
Assessment and Plan:     Problem List Items Addressed This Visit        Cardiovascular and Mediastinum    Essential hypertension - Primary     Controlled, continue amlodipine 5 mg daily and metoprolol 50 mg daily         Relevant Medications    metoprolol succinate (TOPROL-XL) 50 mg 24 hr tablet    amLODIPine (NORVASC) 5 mg tablet    AAA (abdominal aortic aneurysm) ([facility])     Reminded about rechecking ultrasound         Relevant Orders    US abdominal aorta       Genitourinary    CKD (chronic kidney disease) stage 3, GFR 30-59 ml/min ([facility])     Lab Results   Component Value Date    EGFR 55 10/30/2023    EGFR 53 10/05/2022    EGFR 59 07/13/2021    CREATININE 1.25 10/30/2023    CREATININE 1.29 10/05/2022    CREATININE 1.20 07/13/2021   Stable, avoid excessive use of NSAIDs, stay hydrated            Other    Pure hypercholesterolemia     Continue pravastatin along with healthy diet         Relevant Medications    pravastatin (PRAVACHOL) 10 mg tablet    Medicare annual wellness visit, subsequent     Discussed preventative health, cancer screening, immunizations, and safety issues. Last colonoscopy 2016 with recommendations to recheck again in 7 years. Patient had the flu shot this season. Patient had RSV vaccine. Patient had the Shingrix vaccines. Patient had the pneumonia shots. Edema of extremities     No significant problems currently. Elevated PSA     Patient chose not to see urology            Depression Screening and Follow-up Plan: Patient was screened for depression during today's encounter. They screened negative with a PHQ-2 score of 0. Preventive health issues were discussed with patient, and age appropriate screening tests were ordered as noted in patient's After Visit Summary. Personalized health advice and appropriate referrals for health education or preventive services given if needed, as noted in patient's After Visit Summary.      History of Present Illness:     Patient presents for a Medicare Wellness Visit    Onset of cold symptoms 2 days ago, has cough, sore throat, no fevers, no chills, no chest pain, no shortness of breath       Patient Care Team:  Dedra Persaud MD as PCP - General     Review of Systems:     Review of Systems   Constitutional:  Negative for chills, fatigue and fever. HENT:  Positive for rhinorrhea and sore throat. Negative for congestion, nosebleeds, postnasal drip and trouble swallowing. Eyes:  Negative for pain. Respiratory:  Positive for cough. Negative for chest tightness, shortness of breath and wheezing. Cardiovascular:  Negative for chest pain, palpitations and leg swelling. Gastrointestinal:  Negative for abdominal pain, constipation, diarrhea, nausea and vomiting. Endocrine: Negative for polydipsia and polyuria. Genitourinary:  Negative for dysuria, flank pain and hematuria. Musculoskeletal:  Negative for arthralgias. Skin:  Negative for rash. Neurological:  Negative for dizziness, tremors and headaches. Hematological:  Does not bruise/bleed easily. Psychiatric/Behavioral:  Negative for confusion and dysphoric mood. The patient is not nervous/anxious.          Problem List:     Patient Active Problem List   Diagnosis   • Impingement syndrome of right shoulder   • Essential hypertension   • Pure hypercholesterolemia   • Pain in right hip   • Trochanteric bursitis of right hip   • Medicare annual wellness visit, subsequent   • Left hand pain   • Near syncope   • AAA (abdominal aortic aneurysm) ([facility])   • Elevated serum creatinine   • CKD (chronic kidney disease) stage 3, GFR 30-59 ml/min ([facility])   • Trigger middle finger of left hand   • Erectile dysfunction   • Edema of extremities   • SOB (shortness of breath) on exertion   • Elevated PSA      Past Medical and Surgical History:     Past Medical History:   Diagnosis Date   • Hypertension    • Thyroid nodule      Past Surgical History:   Procedure Laterality Date   • BACK SURGERY • KNEE ARTHROSCOPY     • OH ARTHRS KNE SURG W/MENISCECTOMY MED/LAT W/SHVG Left 2017    Procedure: KNEE ARTHROSCOPY; PARTIAL MEDIAL MENISCECTOMY;  Surgeon: Mary Ann Deshpande MD;  [facility];  Service: Orthopedics   • OH TENDON SHEATH INCISION Left 2020    Procedure: LONG TRIGGER FINGER RELEASE;  Surgeon: Jeffry Pina MD;  [facility];  Service: Orthopedics      Family History:     Family History   Problem Relation Age of Onset   • Cancer Mother               Social History:     Social History     Socioeconomic History   • Marital status: /Civil Union     Spouse name: None   • Number of children: None   • Years of education: None   • Highest education level: None   Occupational History   • Occupation: Retired    Tobacco Use   • Smoking status: Former     Packs/day: 1.00     Years: 25.00     Total pack years: 25.00     Types: Cigarettes     Start date: 1960     Quit date: 8/15/1985     Years since quittin.2   • Smokeless tobacco: Never   Vaping Use   • Vaping Use: Never used   Substance and Sexual Activity   • Alcohol use: No     Comment: Social alcohol use - As per [employer]    • Drug use: No   • Sexual activity: Yes     Partners: Female     Birth control/protection: Condom Male   Other Topics Concern   • None   Social History Narrative    Advance directive on file      Social Determinants of Health     Financial Resource Strain: Medium Risk (10/30/2023)    Overall Financial Resource Strain (CARDIA)    • Difficulty of Paying Living Expenses: Somewhat hard   Food Insecurity: Not on file   Transportation Needs: No Transportation Needs (10/30/2023)    PRAPARE - Transportation    • Lack of Transportation (Medical): No    • Lack of Transportation (Non-Medical):  No   Physical Activity: Not on file   Stress: Not on file   Social Connections: Not on file   Intimate Partner Violence: Not on file   Housing Stability: Not on file      Medications and Allergies: Current Outpatient Medications   Medication Sig Dispense Refill   • amLODIPine (NORVASC) 5 mg tablet Take 1 tablet (5 mg total) by mouth daily 90 tablet 3   • metoprolol succinate (TOPROL-XL) 50 mg 24 hr tablet Take 1 tablet (50 mg total) by mouth daily 90 tablet 3   • pravastatin (PRAVACHOL) 10 mg tablet Take 1 tablet (10 mg total) by mouth daily at bedtime 90 tablet 3     No current facility-administered medications for this visit. No Known Allergies   Immunizations:     Immunization History   Administered Date(s) Administered   • COVID-19 MODERNA VACC 0.5 ML IM 01/27/2021, 02/23/2021, 10/29/2021, 08/04/2022   • Hep A, adult 04/26/2017, 10/04/2017   • INFLUENZA 09/22/2014, 09/05/2015, 09/09/2016, 09/21/2017, 09/10/2018, 09/22/2019, 09/24/2021, 09/15/2022, 09/21/2023   • Influenza Split High Dose Preservative Free IM 09/21/2017, 09/10/2018, 09/22/2019   • Pneumococcal Conjugate 13-Valent 10/11/2016   • Pneumococcal Polysaccharide PPV23 08/29/2019   • Respiratory Syncytial Virus Vaccine (Recombinant) 09/25/2023   • Tdap 08/20/2019   • Zoster 04/12/2018   • Zoster Vaccine Recombinant 04/12/2018, 09/01/2018   • influenza, trivalent, adjuvanted 09/22/2019      Health Maintenance:         Topic Date Due   • Hepatitis C Screening  Completed   • Colorectal Cancer Screening  Discontinued         Topic Date Due   • COVID-19 Vaccine (5 - Moderna series) 09/29/2022      Medicare Screening Tests and Risk Assessments:     Kenneth Hurley is here for his Subsequent Wellness visit. Health Risk Assessment:   Patient rates overall health as good. Patient feels that their physical health rating is same. Patient is very satisfied with their life. Eyesight was rated as same. Hearing was rated as same. Patient feels that their emotional and mental health rating is same. Patients states they are never, rarely angry. Patient states they are never, rarely unusually tired/fatigued. Pain experienced in the last 7 days has been none. Patient states that he has experienced no weight loss or gain in last 6 months. Depression Screening:   PHQ-2 Score: 0      Fall Risk Screening: In the past year, patient has experienced: no history of falling in past year      Home Safety:  Patient does not have trouble with stairs inside or outside of their home. Patient has working smoke alarms and has working carbon monoxide detector. Home safety hazards include: none. Nutrition:   Current diet is Regular. Medications:   Patient is not currently taking any over-the-counter supplements. Patient is able to manage medications. Activities of Daily Living (ADLs)/Instrumental Activities of Daily Living (IADLs):   Walk and transfer into and out of bed and chair?: Yes  Dress and groom yourself?: Yes    Bathe or shower yourself?: Yes    Feed yourself? Yes  Do your laundry/housekeeping?: Yes  Manage your money, pay your bills and track your expenses?: Yes  Make your own meals?: Yes    Do your own shopping?: Yes    Previous Hospitalizations:   Any hospitalizations or ED visits within the last 12 months?: No      Advance Care Planning:   Living will: Yes    Durable POA for healthcare:  Yes    Advanced directive: Yes      Cognitive Screening:   Provider or family/friend/caregiver concerned regarding cognition?: No    PREVENTIVE SCREENINGS      Cardiovascular Screening:    General: Screening Not Indicated, History Lipid Disorder, Risks and Benefits Discussed and Screening Current      Diabetes Screening:     General: Screening Current and Risks and Benefits Discussed      Colorectal Cancer Screening:     General: Risks and Benefits Discussed and Screening Current      Prostate Cancer Screening:    General: Screening Not Indicated, Risks and Benefits Discussed and Screening Current      Osteoporosis Screening:    General: Screening Not Indicated      Abdominal Aortic Aneurysm (AAA) Screening:    Risk factors include: tobacco use        General: Screening Not Indicated and History AAA      Lung Cancer Screening:     General: Screening Not Indicated      Hepatitis C Screening:    General: Screening Current    Screening, Brief Intervention, and Referral to Treatment (SBIRT)    Screening  Typical number of drinks in a day: 0  Typical number of drinks in a week: 0  Interpretation: Low risk drinking behavior. AUDIT-C Screenin) How often did you have a drink containing alcohol in the past year? never  2) How many drinks did you have on a typical day when you were drinking in the past year? 0  3) How often did you have 6 or more drinks on one occasion in the past year? never    AUDIT-C Score: 0  Interpretation: Score 0-3 (male): Negative screen for alcohol misuse    Single Item Drug Screening:  How often have you used an illegal drug (including marijuana) or a prescription medication for non-medical reasons in the past year? never    Single Item Drug Screen Score: 0  Interpretation: Negative screen for possible drug use disorder    Brief Intervention  Alcohol & drug use screenings were reviewed. No concerns regarding substance use disorder identified. Other Counseling Topics:   Car/seat belt/driving safety, skin self-exam and sunscreen. No results found. Physical Exam:     /72   Pulse 76   Temp 98 °F (36.7 °C) (Tympanic)   Ht 6' 1" (1.854 m)   Wt 101 kg (222 lb 12.8 oz)   SpO2 100%   BMI 29.39 kg/m²     Physical Exam  Vitals reviewed. Constitutional:       General: He is not in acute distress. Appearance: Normal appearance. He is well-developed. HENT:      Head: Normocephalic and atraumatic. Right Ear: External ear normal.      Left Ear: External ear normal.      Nose: Nose normal.   Eyes:      General: No scleral icterus. Conjunctiva/sclera: Conjunctivae normal.   Neck:      Trachea: No tracheal deviation. Cardiovascular:      Rate and Rhythm: Normal rate and regular rhythm. Heart sounds: Normal heart sounds.  No murmur heard.  Pulmonary:      Effort: Pulmonary effort is normal. No respiratory distress. Breath sounds: Normal breath sounds. No wheezing or rales. Abdominal:      General: Bowel sounds are normal.      Palpations: Abdomen is soft. There is no mass. Tenderness: There is no abdominal tenderness. There is no guarding. Genitourinary:     Comments: Patient did not want exam  Musculoskeletal:      Cervical back: Normal range of motion and neck supple. Right lower leg: No edema. Left lower leg: No edema. Lymphadenopathy:      Cervical: No cervical adenopathy. Skin:     Coloration: Skin is not jaundiced or pale. Neurological:      General: No focal deficit present. Mental Status: He is alert and oriented to person, place, and time. Psychiatric:         Mood and Affect: Mood normal.         Behavior: Behavior normal.         Thought Content:  Thought content normal.         Judgment: Judgment normal.          Aristides Arechiga MD

## 2023-11-06 NOTE — ASSESSMENT & PLAN NOTE
Lab Results   Component Value Date    EGFR 55 10/30/2023    EGFR 53 10/05/2022    EGFR 59 07/13/2021    CREATININE 1.25 10/30/2023    CREATININE 1.29 10/05/2022    CREATININE 1.20 07/13/2021   Stable, avoid excessive use of NSAIDs, stay hydrated

## 2023-11-09 ENCOUNTER — TELEPHONE (OUTPATIENT)
Age: 78
End: 2023-11-09

## 2023-11-09 NOTE — TELEPHONE ENCOUNTER
Pt called and stated he tested positive for covid just now. He took an at home covid test.   Pt requested if Dr. Zuhair Stein can send in a prescription for pt to [facility] ?   Please advise

## 2023-11-10 ENCOUNTER — TELEMEDICINE (OUTPATIENT)
Dept: INTERNAL MEDICINE CLINIC | Facility: CLINIC | Age: 78
End: 2023-11-10
Payer: MEDICARE

## 2023-11-10 DIAGNOSIS — U07.1 COVID-19 VIRUS INFECTION: Primary | ICD-10-CM

## 2023-11-10 PROCEDURE — 99213 OFFICE O/P EST LOW 20 MIN: CPT | Performed by: INTERNAL MEDICINE

## 2023-11-10 NOTE — ASSESSMENT & PLAN NOTE
Onset of symptoms 6 days ago. Pt has loss sense of taste. Discussed with patient with onset of symptoms being 6 days ago, no indication for Paxlovid. Patient should reach out if developing shortness of breath. Patient can do any the normal things to do for cough or cold symptoms. Discussed the possibility of rebound symptoms with Paxlovid. Regarding the loss of taste, patient can do some smell/taste therapy where he takes a familiar scent, smells it, and thinks about what the object should smell like, this has been shown to help people who have lost their sense of taste. This is generally done when the taste does not resolve on its own which it usually does within a couple of weeks.   Rarely the loss of taste can be longer, and if he does have any loss of taste, formal smell therapy can be initiated

## 2023-11-10 NOTE — PROGRESS NOTES
Virtual Regular Visit    Verification of patient location:    Patient is located at Home in the following state in which I hold an active license [place]      Assessment/Plan:    Problem List Items Addressed This Visit        Other    COVID-19 virus infection - Primary     Onset of symptoms 6 days ago. Pt has loss sense of taste. Discussed with patient with onset of symptoms being 6 days ago, no indication for Paxlovid. Patient should reach out if developing shortness of breath. Patient can do any the normal things to do for cough or cold symptoms. Discussed the possibility of rebound symptoms with Paxlovid. Regarding the loss of taste, patient can do some smell/taste therapy where he takes a familiar scent, smells it, and thinks about what the object should smell like, this has been shown to help people who have lost their sense of taste. This is generally done when the taste does not resolve on its own which it usually does within a couple of weeks. Rarely the loss of taste can be longer, and if he does have any loss of taste, formal smell therapy can be initiated                 Reason for visit is   Chief Complaint   Patient presents with   • Virtual Regular Visit          Encounter provider Dee Dee Anna MD    Provider located at [place] [phone]      Recent Visits  Date Type Provider Dept   11/06/23 Office Visit Dee Dee Anna MD [address] recent visits within past 7 days and meeting all other requirements  Today's Visits  Date Type Provider Dept   11/10/23 Telemedicine Dee Dee Anna MD [address] today's visits and meeting all other requirements  Future Appointments  No visits were found meeting these conditions. Showing future appointments within next 150 days and meeting all other requirements       The patient was identified by name and date of birth.  Ghassan You was informed that this is a telemedicine visit and that the visit is being conducted through the [employer]. He agrees to proceed. .  My office door was closed. No one else was in the room. He acknowledged consent and understanding of privacy and security of the video platform. The patient has agreed to participate and understands they can discontinue the visit at any time. Patient is aware this is a billable service. Adonay Garcia is a 68 y.o. male  . Pt tested positive for COVID, onset of symptoms 6 days ago. Past Medical History:   Diagnosis Date   • Hypertension    • Thyroid nodule        Past Surgical History:   Procedure Laterality Date   • BACK SURGERY     • KNEE ARTHROSCOPY     • ME ARTHRS KNE SURG W/MENISCECTOMY MED/LAT W/SHVG Left 9/19/2017    Procedure: KNEE ARTHROSCOPY; PARTIAL MEDIAL MENISCECTOMY;  Surgeon: Jade Valencia MD;  [facility];  Service: Orthopedics   • ME TENDON SHEATH INCISION Left 11/30/2020    Procedure: LONG TRIGGER FINGER RELEASE;  Surgeon: Merlin Side, MD;  [facility];  Service: Orthopedics       Current Outpatient Medications   Medication Sig Dispense Refill   • amLODIPine (NORVASC) 5 mg tablet Take 1 tablet (5 mg total) by mouth daily 90 tablet 3   • metoprolol succinate (TOPROL-XL) 50 mg 24 hr tablet Take 1 tablet (50 mg total) by mouth daily 90 tablet 3   • pravastatin (PRAVACHOL) 10 mg tablet Take 1 tablet (10 mg total) by mouth daily at bedtime 90 tablet 3     No current facility-administered medications for this visit. No Known Allergies    Review of Systems   Constitutional:  Positive for fatigue. Negative for chills and fever. HENT:  Negative for sore throat. Respiratory:  Negative for cough and shortness of breath. Cardiovascular:  Negative for chest pain. Neurological:  Positive for weakness. Video Exam    There were no vitals filed for this visit.     Physical Exam  Constitutional: General: He is not in acute distress. Appearance: Normal appearance. Pulmonary:      Effort: Pulmonary effort is normal. No respiratory distress. Neurological:      Mental Status: He is alert.           Visit Time  Total Visit Duration: 15

## 2023-11-10 NOTE — PATIENT INSTRUCTIONS
Problem List Items Addressed This Visit          Other    COVID-19 virus infection - Primary     Onset of symptoms 6 days ago. Pt has loss sense of taste. Discussed with patient with onset of symptoms being 6 days ago, no indication for Paxlovid. Patient should reach out if developing shortness of breath. Patient can do any the normal things to do for cough or cold symptoms. Discussed the possibility of rebound symptoms with Paxlovid. Regarding the loss of taste, patient can do some smell/taste therapy where he takes a familiar scent, smells it, and thinks about what the object should smell like, this has been shown to help people who have lost their sense of taste. This is generally done when the taste does not resolve on its own which it usually does within a couple of weeks.   Rarely the loss of taste can be longer, and if he does have any loss of taste, formal smell therapy can be initiated

## 2023-11-15 ENCOUNTER — HOSPITAL ENCOUNTER (OUTPATIENT)
Dept: RADIOLOGY | Age: 78
Discharge: HOME | End: 2023-11-15
Payer: MEDICARE

## 2023-11-15 DIAGNOSIS — I71.40 ABDOMINAL AORTIC ANEURYSM (AAA) WITHOUT RUPTURE, UNSPECIFIED PART (HCC): ICD-10-CM

## 2023-11-15 PROCEDURE — 76775 US EXAM ABDO BACK WALL LIM: CPT

## 2023-12-05 DIAGNOSIS — E78.00 PURE HYPERCHOLESTEROLEMIA: ICD-10-CM

## 2023-12-05 RX ORDER — PRAVASTATIN SODIUM 10 MG
10 TABLET ORAL
Qty: 90 TABLET | Refills: 1 | Status: SHIPPED | OUTPATIENT
Start: 2023-12-05

## 2024-02-21 PROBLEM — Z00.00 MEDICARE ANNUAL WELLNESS VISIT, SUBSEQUENT: Status: RESOLVED | Noted: 2020-06-09 | Resolved: 2024-02-21

## 2024-06-11 DIAGNOSIS — E78.00 PURE HYPERCHOLESTEROLEMIA: ICD-10-CM

## 2024-06-12 RX ORDER — PRAVASTATIN SODIUM 10 MG
10 TABLET ORAL
Qty: 90 TABLET | Refills: 1 | Status: SHIPPED | OUTPATIENT
Start: 2024-06-12

## 2024-07-18 DIAGNOSIS — I10 ESSENTIAL HYPERTENSION: ICD-10-CM

## 2024-07-18 RX ORDER — METOPROLOL SUCCINATE 50 MG/1
50 TABLET, EXTENDED RELEASE ORAL DAILY
Qty: 100 TABLET | Refills: 1 | Status: SHIPPED | OUTPATIENT
Start: 2024-07-18

## 2024-10-22 ENCOUNTER — TELEPHONE (OUTPATIENT)
Age: 79
End: 2024-10-22

## 2024-10-22 DIAGNOSIS — E55.9 VITAMIN D DEFICIENCY: ICD-10-CM

## 2024-10-22 DIAGNOSIS — E78.00 PURE HYPERCHOLESTEROLEMIA: Primary | ICD-10-CM

## 2024-10-22 DIAGNOSIS — Z12.5 SCREENING FOR PROSTATE CANCER: ICD-10-CM

## 2024-10-22 NOTE — TELEPHONE ENCOUNTER
Patient called requesting lab work.  Patient has his annual wellness on 11/12    If patient can be contacted once ordered so he can go have labs drawn

## 2024-11-05 ENCOUNTER — RA CDI HCC (OUTPATIENT)
Dept: OTHER | Facility: HOSPITAL | Age: 79
End: 2024-11-05

## 2024-11-06 ENCOUNTER — LAB (OUTPATIENT)
Dept: LAB | Age: 79
End: 2024-11-06
Payer: MEDICARE

## 2024-11-06 DIAGNOSIS — E78.00 PURE HYPERCHOLESTEROLEMIA: ICD-10-CM

## 2024-11-06 DIAGNOSIS — Z12.5 SCREENING FOR PROSTATE CANCER: ICD-10-CM

## 2024-11-06 DIAGNOSIS — E55.9 VITAMIN D DEFICIENCY: ICD-10-CM

## 2024-11-06 LAB
25(OH)D3 SERPL-MCNC: 28.9 NG/ML (ref 30–100)
ALBUMIN SERPL BCG-MCNC: 4.4 G/DL (ref 3.5–5)
ALP SERPL-CCNC: 46 U/L (ref 34–104)
ALT SERPL W P-5'-P-CCNC: 24 U/L (ref 7–52)
ANION GAP SERPL CALCULATED.3IONS-SCNC: 10 MMOL/L (ref 4–13)
AST SERPL W P-5'-P-CCNC: 24 U/L (ref 13–39)
BASOPHILS # BLD AUTO: 0.07 THOUSANDS/ÂΜL (ref 0–0.1)
BASOPHILS NFR BLD AUTO: 1 % (ref 0–1)
BILIRUB SERPL-MCNC: 0.53 MG/DL (ref 0.2–1)
BUN SERPL-MCNC: 21 MG/DL (ref 5–25)
CALCIUM SERPL-MCNC: 9.5 MG/DL (ref 8.4–10.2)
CHLORIDE SERPL-SCNC: 102 MMOL/L (ref 96–108)
CHOLEST SERPL-MCNC: 193 MG/DL
CO2 SERPL-SCNC: 26 MMOL/L (ref 21–32)
CREAT SERPL-MCNC: 1.27 MG/DL (ref 0.6–1.3)
EOSINOPHIL # BLD AUTO: 0.13 THOUSAND/ÂΜL (ref 0–0.61)
EOSINOPHIL NFR BLD AUTO: 2 % (ref 0–6)
ERYTHROCYTE [DISTWIDTH] IN BLOOD BY AUTOMATED COUNT: 13.8 % (ref 11.6–15.1)
GFR SERPL CREATININE-BSD FRML MDRD: 53 ML/MIN/1.73SQ M
GLUCOSE P FAST SERPL-MCNC: 83 MG/DL (ref 65–99)
HCT VFR BLD AUTO: 43.8 % (ref 36.5–49.3)
HDLC SERPL-MCNC: 54 MG/DL
HGB BLD-MCNC: 14.7 G/DL (ref 12–17)
IMM GRANULOCYTES # BLD AUTO: 0.01 THOUSAND/UL (ref 0–0.2)
IMM GRANULOCYTES NFR BLD AUTO: 0 % (ref 0–2)
LDLC SERPL CALC-MCNC: 114 MG/DL (ref 0–100)
LYMPHOCYTES # BLD AUTO: 1.67 THOUSANDS/ÂΜL (ref 0.6–4.47)
LYMPHOCYTES NFR BLD AUTO: 26 % (ref 14–44)
MCH RBC QN AUTO: 31.7 PG (ref 26.8–34.3)
MCHC RBC AUTO-ENTMCNC: 33.6 G/DL (ref 31.4–37.4)
MCV RBC AUTO: 95 FL (ref 82–98)
MONOCYTES # BLD AUTO: 0.47 THOUSAND/ÂΜL (ref 0.17–1.22)
MONOCYTES NFR BLD AUTO: 7 % (ref 4–12)
NEUTROPHILS # BLD AUTO: 4.17 THOUSANDS/ÂΜL (ref 1.85–7.62)
NEUTS SEG NFR BLD AUTO: 64 % (ref 43–75)
NRBC BLD AUTO-RTO: 0 /100 WBCS
PLATELET # BLD AUTO: 349 THOUSANDS/UL (ref 149–390)
PMV BLD AUTO: 10.3 FL (ref 8.9–12.7)
POTASSIUM SERPL-SCNC: 4.2 MMOL/L (ref 3.5–5.3)
PROT SERPL-MCNC: 7.6 G/DL (ref 6.4–8.4)
PSA SERPL-MCNC: 5.22 NG/ML (ref 0–4)
RBC # BLD AUTO: 4.63 MILLION/UL (ref 3.88–5.62)
SODIUM SERPL-SCNC: 138 MMOL/L (ref 135–147)
TRIGL SERPL-MCNC: 126 MG/DL
TSH SERPL DL<=0.05 MIU/L-ACNC: 3.68 UIU/ML (ref 0.45–4.5)
WBC # BLD AUTO: 6.52 THOUSAND/UL (ref 4.31–10.16)

## 2024-11-06 PROCEDURE — 80061 LIPID PANEL: CPT

## 2024-11-06 PROCEDURE — 82306 VITAMIN D 25 HYDROXY: CPT

## 2024-11-06 PROCEDURE — 36415 COLL VENOUS BLD VENIPUNCTURE: CPT

## 2024-11-06 PROCEDURE — 80053 COMPREHEN METABOLIC PANEL: CPT

## 2024-11-06 PROCEDURE — 84443 ASSAY THYROID STIM HORMONE: CPT

## 2024-11-06 PROCEDURE — 85025 COMPLETE CBC W/AUTO DIFF WBC: CPT

## 2024-11-06 PROCEDURE — G0103 PSA SCREENING: HCPCS

## 2024-11-07 NOTE — RESULT ENCOUNTER NOTE
Labs ok except PSA still elevated, in the past patient has declined evaluation with urology, will review in more detail at upcoming appt.

## 2024-11-12 ENCOUNTER — OFFICE VISIT (OUTPATIENT)
Dept: INTERNAL MEDICINE CLINIC | Facility: CLINIC | Age: 79
End: 2024-11-12
Payer: MEDICARE

## 2024-11-12 VITALS
WEIGHT: 130.2 LBS | HEART RATE: 63 BPM | BODY MASS INDEX: 17.18 KG/M2 | SYSTOLIC BLOOD PRESSURE: 138 MMHG | DIASTOLIC BLOOD PRESSURE: 78 MMHG | OXYGEN SATURATION: 99 %

## 2024-11-12 DIAGNOSIS — N18.31 STAGE 3A CHRONIC KIDNEY DISEASE (HCC): ICD-10-CM

## 2024-11-12 DIAGNOSIS — R97.20 ELEVATED PSA: ICD-10-CM

## 2024-11-12 DIAGNOSIS — R60.0 EDEMA OF EXTREMITIES: ICD-10-CM

## 2024-11-12 DIAGNOSIS — I71.40 ABDOMINAL AORTIC ANEURYSM (AAA) WITHOUT RUPTURE, UNSPECIFIED PART (HCC): ICD-10-CM

## 2024-11-12 DIAGNOSIS — I10 ESSENTIAL HYPERTENSION: Primary | ICD-10-CM

## 2024-11-12 DIAGNOSIS — Z00.00 MEDICARE ANNUAL WELLNESS VISIT, SUBSEQUENT: ICD-10-CM

## 2024-11-12 DIAGNOSIS — E78.00 PURE HYPERCHOLESTEROLEMIA: ICD-10-CM

## 2024-11-12 PROCEDURE — G0439 PPPS, SUBSEQ VISIT: HCPCS | Performed by: INTERNAL MEDICINE

## 2024-11-12 PROCEDURE — 99214 OFFICE O/P EST MOD 30 MIN: CPT | Performed by: INTERNAL MEDICINE

## 2024-11-12 NOTE — PATIENT INSTRUCTIONS
Problem List Items Addressed This Visit          Cardiovascular and Mediastinum    Essential hypertension - Primary     Controlled, continue meds along with healthy diet and exercise         AAA (abdominal aortic aneurysm) ([facility])     Ultrasound November 2023 showed no evidence of abdominal aortic aneurysm, just slight enlargement which was stable when compared to 2020            Genitourinary    CKD (chronic kidney disease) stage 3, GFR 30-59 ml/min ([facility])     Lab Results   Component Value Date    EGFR 53 11/06/2024    EGFR 55 10/30/2023    EGFR 53 10/05/2022    CREATININE 1.27 11/06/2024    CREATININE 1.25 10/30/2023    CREATININE 1.29 10/05/2022   Mild, stable, avoid excessive use of NSAIDs, stay hydrated            Urinary    Elevated PSA     In the past patient has chosen not to see urology, PSA is still elevated, no urinary complaints, discussed and recommended evaluation with urology, pt does not want at this time.            Other    Pure hypercholesterolemia     Continue pravastatin along with healthy diet         Medicare annual wellness visit, subsequent     Discussed preventative health, cancer screening, immunizations, and safety issues.  Last colonoscopy 2016 with recommendations to recheck again in 7 years, patient decided not to get the follow-up colonoscopy.  Patient had the RSV vaccine.  Patient already had the flu shot this season.  Patient had the Shingrix vaccines.  Patient had Tdap vaccination 9/27/2024.  Since has been more than 5 years since his pneumonia shots, he could get Prevnar 20.         Edema of extremities     Stay active, can elevate 3 times a day for 30 minutes, avoid excessive salt intake            Medicare Preventive Visit Patient Instructions  Thank you for completing your Welcome to Medicare Visit or Medicare Annual Wellness Visit today. Your next wellness visit will be due in one year (11/13/2025).  The screening/preventive services that you may require over the next 5-10 years  are detailed below. Some tests may not apply to you based off risk factors and/or age. Screening tests ordered at today's visit but not completed yet may show as past due. Also, please note that scanned in results may not display below.  Preventive Screenings:  Service Recommendations Previous Testing/Comments   Colorectal Cancer Screening  Colonoscopy    Fecal Occult Blood Test (FOBT)/Fecal Immunochemical Test (FIT)  Fecal DNA/Cologuard Test  Flexible Sigmoidoscopy Age: 45-75 years old   Colonoscopy: every 10 years (May be performed more frequently if at higher risk)  OR  FOBT/FIT: every 1 year  OR  Cologuard: every 3 years  OR  Sigmoidoscopy: every 5 years  Screening may be recommended earlier than age 45 if at higher risk for colorectal cancer. Also, an individualized decision between you and your healthcare provider will decide whether screening between the ages of 76-85 would be appropriate. Colonoscopy: 08/17/2016  FOBT/FIT: Not on file  Cologuard: Not on file  Sigmoidoscopy: Not on file          Prostate Cancer Screening Individualized decision between patient and health care provider in men between ages of 55-69   Medicare will cover every 12 months beginning on the day after your 50th birthday PSA: 5.223 ng/mL           Hepatitis C Screening Once for adults born between 1945 and 1965  More frequently in patients at high risk for Hepatitis C Hep C Antibody: 09/08/2017        Diabetes Screening 1-2 times per year if you're at risk for diabetes or have pre-diabetes Fasting glucose: 83 mg/dL (11/6/2024)  A1C: 5.3 % (10/30/2020)      Cholesterol Screening Once every 5 years if you don't have a lipid disorder. May order more often based on risk factors. Lipid panel: 11/06/2024         Other Preventive Screenings Covered by Medicare:  Abdominal Aortic Aneurysm (AAA) Screening: covered once if your at risk. You're considered to be at risk if you have a family history of AAA or a male between the age of 65-75 who  smoking at least 100 cigarettes in your lifetime.  Lung Cancer Screening: covers low dose CT scan once per year if you meet all of the following conditions: (1) Age 55-77; (2) No signs or symptoms of lung cancer; (3) Current smoker or have quit smoking within the last 15 years; (4) You have a tobacco smoking history of at least 20 pack years (packs per day x number of years you smoked); (5) You get a written order from a healthcare provider.  Glaucoma Screening: covered annually if you're considered high risk: (1) You have diabetes OR (2) Family history of glaucoma OR (3)  aged 50 and older OR (4)  American aged 65 and older  Osteoporosis Screening: covered every 2 years if you meet one of the following conditions: (1) Have a vertebral abnormality; (2) On glucocorticoid therapy for more than 3 months; (3) Have primary hyperparathyroidism; (4) On osteoporosis medications and need to assess response to drug therapy.  HIV Screening: covered annually if you're between the age of 15-65. Also covered annually if you are younger than 15 and older than 65 with risk factors for HIV infection. For pregnant patients, it is covered up to 3 times per pregnancy.    Immunizations:  Immunization Recommendations   Influenza Vaccine Annual influenza vaccination during flu season is recommended for all persons aged >= 6 months who do not have contraindications   Pneumococcal Vaccine   * Pneumococcal conjugate vaccine = PCV13 (Prevnar 13), PCV15 (Vaxneuvance), PCV20 (Prevnar 20)  * Pneumococcal polysaccharide vaccine = PPSV23 (Pneumovax) Adults 19-65 yo with certain risk factors or if 65+ yo  If never received any pneumonia vaccine: recommend Prevnar 20 (PCV20)  Give PCV20 if previously received 1 dose of PCV13 or PPSV23   Hepatitis B Vaccine 3 dose series if at intermediate or high risk (ex: diabetes, end stage renal disease, liver disease)   Respiratory syncytial virus (RSV) Vaccine - COVERED BY MEDICARE PART  D  * RSVPreF3 (Arexvy) CDC recommends that adults 60 years of age and older may receive a single dose of RSV vaccine using shared clinical decision-making (SCDM)   Tetanus (Td) Vaccine - COST NOT COVERED BY MEDICARE PART B Following completion of primary series, a booster dose should be given every 10 years to maintain immunity against tetanus. Td may also be given as tetanus wound prophylaxis.   Tdap Vaccine - COST NOT COVERED BY MEDICARE PART B Recommended at least once for all adults. For pregnant patients, recommended with each pregnancy.   Shingles Vaccine (Shingrix) - COST NOT COVERED BY MEDICARE PART B  2 shot series recommended in those 19 years and older who have or will have weakened immune systems or those 50 years and older     Health Maintenance Due:      Topic Date Due    Hepatitis C Screening  Completed    Colorectal Cancer Screening  Discontinued     Immunizations Due:      Topic Date Due    COVID-19 Vaccine (6 - 2023-24 season) 09/01/2024     Advance Directives   What are advance directives?  Advance directives are legal documents that state your wishes and plans for medical care. These plans are made ahead of time in case you lose your ability to make decisions for yourself. Advance directives can apply to any medical decision, such as the treatments you want, and if you want to donate organs.   What are the types of advance directives?  There are many types of advance directives, and each state has rules about how to use them. You may choose a combination of any of the following:  Living will:  This is a written record of the treatment you want. You can also choose which treatments you do not want, which to limit, and which to stop at a certain time. This includes surgery, medicine, IV fluid, and tube feedings.   Durable power of  for healthcare (DPAHC):  This is a written record that states who you want to make healthcare choices for you when you are unable to make them for yourself. This  person, called a proxy, is usually a family member or a friend. You may choose more than 1 proxy.  Do not resuscitate (DNR) order:  A DNR order is used in case your heart stops beating or you stop breathing. It is a request not to have certain forms of treatment, such as CPR. A DNR order may be included in other types of advance directives.  Medical directive:  This covers the care that you want if you are in a coma, near death, or unable to make decisions for yourself. You can list the treatments you want for each condition. Treatment may include pain medicine, surgery, blood transfusions, dialysis, IV or tube feedings, and a ventilator (breathing machine).  Values history:  This document has questions about your views, beliefs, and how you feel and think about life. This information can help others choose the care that you would choose.  Why are advance directives important?  An advance directive helps you control your care. Although spoken wishes may be used, it is better to have your wishes written down. Spoken wishes can be misunderstood, or not followed. Treatments may be given even if you do not want them. An advance directive may make it easier for your family to make difficult choices about your care.       © Copyright [employer] 2018 Information is for End User's use only and may not be sold, redistributed or otherwise used for commercial purposes. All illustrations and images included in CareNotes® are the copyrighted property of [employer] or [employer]  Colorectal Cancer Screening    There are approximately 150,000 new cases of colorectal cancer diagnosed anually in the [place]    Approximately 53,000 Americans die of colon cancer every year    Colorectal cancer causes 8% of all cancer related deaths in the [place]. That is almost 1 out of every 10 deaths from all cancers.    The incidence of colorectal cancer in people under the age of 50 has steadily increased at a rate of 2 percent  per year from 1995 through 2018    The overall death rate from colorectal cancer have declined overall since the mid-1980s in the [place] and this is partially attributed to increased screening    Screening Procedures    There are multiple ways to screen for colorectal cancer which include:  Colonoscopy  CT Colonography (virtual colonoscopy)  Sigmoidoscopy  Fecal Immunochemical Test (FIT)  DNA Stool Test (cologuard)  High Sensitivity Fecal Occult Blood Test (FOBT)    Colonoscopy remains the gold standard in regards to screening for colorectal cancer. Generally during a colonoscopy, sedation is used to make you comfortable. Then a scope is used to look in the colon to evaluate for evidence of colon polyps or evidence of cancer.     Risk Factors    Outside of a genetic predisposition for colorectal cancer, family history is the second most important risk factor. Having a single affected first-degree relative (parent, sibling, or child) with colorectal cancer increases the risk approximately two-fold over that of the general population  Overweight/Obesity  Diabetes mellitus and insulin resistance   Tobacco  Alcohol  Lack of regular physical activity  Diet low in fruits/vegetables, high in processed foods, low in fiber, and/or high in fat    Signs/Symptoms    Change in bowel habits  Diarrhea or constipation persisting longer than 4 weeks  Rectal bleeding  Prolonged abdominal discomfort  Weakness or fatigue  Weight loss    Early detection is BEST!  Schedule your colonoscopy TODAY if you are 45 years or older

## 2024-11-12 NOTE — ASSESSMENT & PLAN NOTE
Discussed preventative health, cancer screening, immunizations, and safety issues.  Last colonoscopy 2016 with recommendations to recheck again in 7 years, patient decided not to get the follow-up colonoscopy.  Patient had the RSV vaccine.  Patient already had the flu shot this season.  Patient had the Shingrix vaccines.  Patient had Tdap vaccination 9/27/2024.  Since has been more than 5 years since his pneumonia shots, he could get Prevnar 20.

## 2024-11-12 NOTE — ASSESSMENT & PLAN NOTE
Ultrasound November 2023 showed no evidence of abdominal aortic aneurysm, just slight enlargement which was stable when compared to 2020

## 2024-11-12 NOTE — PROGRESS NOTES
Ambulatory Visit  Name: Rodney Lee      : 1945      MRN: [id number]  Encounter Provider: Bo Leavitt MD  Encounter Date: 2024   Encounter department: [facility]  Medicare annual wellness visit, subsequent  Discussed preventative health, cancer screening, immunizations, and safety issues.  Last colonoscopy  with recommendations to recheck again in 7 years, patient decided not to get the follow-up colonoscopy.  Patient had the RSV vaccine.  Patient already had the flu shot this season.  Patient had the Shingrix vaccines.  Patient had Tdap vaccination 2024.  Since has been more than 5 years since his pneumonia shots, he could get Prevnar 20.         Elevated PSA  In the past patient has chosen not to see urology, PSA is still elevated, no urinary complaints, discussed and recommended evaluation with urology, pt does not want at this time.         Stage 3a chronic kidney disease (HCC)  Lab Results   Component Value Date    EGFR 53 2024    EGFR 55 10/30/2023    EGFR 53 10/05/2022    CREATININE 1.27 2024    CREATININE 1.25 10/30/2023    CREATININE 1.29 10/05/2022   Mild, stable, avoid excessive use of NSAIDs, stay hydrated         Essential hypertension  Controlled, continue meds along with healthy diet and exercise         Pure hypercholesterolemia  Continue pravastatin along with healthy diet         Abdominal aortic aneurysm (AAA) without rupture, unspecified part (HCC)  Ultrasound 2023 showed no evidence of abdominal aortic aneurysm, just slight enlargement which was stable when compared to          Edema of extremities  Stay active, can elevate 3 times a day for 30 minutes, avoid excessive salt intake           Depression Screening and Follow-up Plan: Patient was screened for depression during today's encounter. They screened negative with a PHQ-2 score of 0.      Preventive health issues were discussed with patient,  and age appropriate screening tests were ordered as noted in patient's After Visit Summary. Personalized health advice and appropriate referrals for health education or preventive services given if needed, as noted in patient's After Visit Summary.    History of Present Illness     Patient here for annual wellness visit and follow-up of chronic conditions       Patient Care Team:  Bo Leavitt MD as PCP - General    Review of Systems   Constitutional:  Negative for chills, fatigue and fever.   HENT:  Negative for congestion, nosebleeds, postnasal drip, sore throat and trouble swallowing.    Eyes:  Negative for pain.   Respiratory:  Negative for cough, chest tightness, shortness of breath and wheezing.    Cardiovascular:  Negative for chest pain, palpitations and leg swelling.   Gastrointestinal:  Negative for abdominal pain, constipation, diarrhea, nausea and vomiting.   Endocrine: Negative for polydipsia and polyuria.   Genitourinary:  Negative for dysuria, flank pain and hematuria.   Musculoskeletal:  Negative for arthralgias, back pain and myalgias.   Skin:  Negative for rash.   Neurological:  Negative for dizziness, tremors, light-headedness and headaches.   Hematological:  Does not bruise/bleed easily.   Psychiatric/Behavioral:  Negative for confusion and dysphoric mood. The patient is not nervous/anxious.      Medical History Reviewed by provider this encounter:  Tobacco  Allergies  Meds  Problems  Med Hx  Surg Hx  Fam Hx       Annual Wellness Visit Questionnaire   Rodney is here for his Subsequent Wellness visit.     Health Risk Assessment:   Patient rates overall health as very good. Patient feels that their physical health rating is same. Patient is very satisfied with their life. Eyesight was rated as same. Hearing was rated as same. Patient feels that their emotional and mental health rating is same. Patients states they are never, rarely angry. Patient states they are never, rarely unusually  tired/fatigued. Pain experienced in the last 7 days has been none. Patient states that he has experienced no weight loss or gain in last 6 months.     Depression Screening:   PHQ-2 Score: 0      Fall Risk Screening:   In the past year, patient has experienced: no history of falling in past year      Home Safety:  Patient does not have trouble with stairs inside or outside of their home. Patient has working smoke alarms and has working carbon monoxide detector. Home safety hazards include: none.     Nutrition:   Current diet is Regular.     Medications:   Patient is not currently taking any over-the-counter supplements. Patient is able to manage medications.     Activities of Daily Living (ADLs)/Instrumental Activities of Daily Living (IADLs):   Walk and transfer into and out of bed and chair?: Yes  Dress and groom yourself?: Yes    Bathe or shower yourself?: Yes    Feed yourself? Yes  Do your laundry/housekeeping?: Yes  Manage your money, pay your bills and track your expenses?: Yes  Make your own meals?: Yes    Do your own shopping?: Yes    Durable Medical Equipment Suppliers  none    Previous Hospitalizations:   Any hospitalizations or ED visits within the last 12 months?: No      Advance Care Planning:   Living will: Yes    Durable POA for healthcare: Yes    Advanced directive: Yes      Cognitive Screening:   Provider or family/friend/caregiver concerned regarding cognition?: No    PREVENTIVE SCREENINGS      Cardiovascular Screening:    General: Screening Not Indicated, History Lipid Disorder, Risks and Benefits Discussed and Screening Current      Diabetes Screening:     General: Screening Current and Risks and Benefits Discussed      Prostate Cancer Screening:    General: Screening Not Indicated      Abdominal Aortic Aneurysm (AAA) Screening:    Risk factors include: tobacco use        General: Screening Not Indicated and History AAA      Lung Cancer Screening:     General: Screening Not Indicated       Hepatitis C Screening:    General: Screening Current    Screening, Brief Intervention, and Referral to Treatment (SBIRT)    Screening  Typical number of drinks in a day: 0  Typical number of drinks in a week: 0  Interpretation: Low risk drinking behavior.    AUDIT-C Screenin) How often did you have a drink containing alcohol in the past year? never  2) How many drinks did you have on a typical day when you were drinking in the past year? 0  3) How often did you have 6 or more drinks on one occasion in the past year? never    AUDIT-C Score: 0  Interpretation: Score 0-3 (male): Negative screen for alcohol misuse    Single Item Drug Screening:  How often have you used an illegal drug (including marijuana) or a prescription medication for non-medical reasons in the past year? never    Single Item Drug Screen Score: 0  Interpretation: Negative screen for possible drug use disorder    Social Determinants of Health     Financial Resource Strain: Medium Risk (10/30/2023)    Overall Financial Resource Strain (CARDIA)     Difficulty of Paying Living Expenses: Somewhat hard   Food Insecurity: No Food Insecurity (2024)    Hunger Vital Sign     Worried About Running Out of Food in the Last Year: Never true     Ran Out of Food in the Last Year: Never true   Transportation Needs: No Transportation Needs (2024)    PRAPARE - Transportation     Lack of Transportation (Medical): No     Lack of Transportation (Non-Medical): No   Housing Stability: Low Risk  (2024)    Housing Stability Vital Sign     Unable to Pay for Housing in the Last Year: No     Number of Times Moved in the Last Year: 0     Homeless in the Last Year: No   Utilities: Not At Risk (2024)    [facility] Utilities     Threatened with loss of utilities: No     No results found.    Objective     /78 (BP Location: Left arm, Patient Position: Sitting, Cuff Size: Standard)   Pulse 63   Wt 59.1 kg (130 lb 3.2 oz)   SpO2 99%   BMI 17.18 kg/m²      Physical Exam  Vitals reviewed.   Constitutional:       General: He is not in acute distress.     Appearance: Normal appearance. He is well-developed.   HENT:      Head: Normocephalic and atraumatic.      Right Ear: External ear normal.      Left Ear: External ear normal.      Nose: Nose normal.   Eyes:      General: No scleral icterus.     Conjunctiva/sclera: Conjunctivae normal.   Neck:      Trachea: No tracheal deviation.   Cardiovascular:      Rate and Rhythm: Normal rate and regular rhythm.      Heart sounds: Normal heart sounds. No murmur heard.  Pulmonary:      Effort: Pulmonary effort is normal. No respiratory distress.      Breath sounds: Normal breath sounds. No wheezing or rales.   Abdominal:      General: Bowel sounds are normal.      Palpations: Abdomen is soft. There is no mass.      Tenderness: There is no abdominal tenderness. There is no guarding.      Hernia: There is no hernia in the left inguinal area or right inguinal area.   Genitourinary:     Testes: Normal.   Musculoskeletal:      Cervical back: Normal range of motion and neck supple.      Right lower leg: Edema (mild) present.      Left lower leg: Edema (mild) present.   Lymphadenopathy:      Cervical: No cervical adenopathy.   Skin:     Coloration: Skin is not jaundiced or pale.   Neurological:      General: No focal deficit present.      Mental Status: He is alert and oriented to person, place, and time.   Psychiatric:         Mood and Affect: Mood normal.         Behavior: Behavior normal.         Thought Content: Thought content normal.         Judgment: Judgment normal.

## 2024-11-12 NOTE — ASSESSMENT & PLAN NOTE
Lab Results   Component Value Date    EGFR 53 11/06/2024    EGFR 55 10/30/2023    EGFR 53 10/05/2022    CREATININE 1.27 11/06/2024    CREATININE 1.25 10/30/2023    CREATININE 1.29 10/05/2022   Mild, stable, avoid excessive use of NSAIDs, stay hydrated

## 2024-11-12 NOTE — ASSESSMENT & PLAN NOTE
In the past patient has chosen not to see urology, PSA is still elevated, no urinary complaints, discussed and recommended evaluation with urology, pt does not want at this time.

## 2024-11-22 DIAGNOSIS — I10 ESSENTIAL HYPERTENSION: ICD-10-CM

## 2024-11-22 RX ORDER — AMLODIPINE BESYLATE 5 MG/1
5 TABLET ORAL DAILY
Qty: 90 TABLET | Refills: 1 | Status: SHIPPED | OUTPATIENT
Start: 2024-11-22

## 2024-12-12 PROBLEM — Z00.00 MEDICARE ANNUAL WELLNESS VISIT, SUBSEQUENT: Status: RESOLVED | Noted: 2020-06-09 | Resolved: 2024-12-12

## 2024-12-18 DIAGNOSIS — E78.00 PURE HYPERCHOLESTEROLEMIA: ICD-10-CM

## 2024-12-19 RX ORDER — PRAVASTATIN SODIUM 10 MG
10 TABLET ORAL
Qty: 90 TABLET | Refills: 1 | Status: SHIPPED | OUTPATIENT
Start: 2024-12-19

## 2025-01-14 DIAGNOSIS — I10 ESSENTIAL HYPERTENSION: ICD-10-CM

## 2025-01-14 RX ORDER — METOPROLOL SUCCINATE 50 MG/1
50 TABLET, EXTENDED RELEASE ORAL DAILY
Qty: 100 TABLET | Refills: 1 | Status: SHIPPED | OUTPATIENT
Start: 2025-01-14

## 2025-04-06 ENCOUNTER — OFFICE VISIT (OUTPATIENT)
Dept: URGENT CARE | Age: 80
End: 2025-04-06
Payer: MEDICARE

## 2025-04-06 ENCOUNTER — APPOINTMENT (OUTPATIENT)
Dept: RADIOLOGY | Age: 80
End: 2025-04-06
Payer: MEDICARE

## 2025-04-06 VITALS
RESPIRATION RATE: 18 BRPM | OXYGEN SATURATION: 97 % | SYSTOLIC BLOOD PRESSURE: 150 MMHG | HEIGHT: 73 IN | TEMPERATURE: 98.7 F | WEIGHT: 230 LBS | HEART RATE: 80 BPM | BODY MASS INDEX: 30.48 KG/M2 | DIASTOLIC BLOOD PRESSURE: 80 MMHG

## 2025-04-06 DIAGNOSIS — R06.02 SHORTNESS OF BREATH: ICD-10-CM

## 2025-04-06 DIAGNOSIS — R05.1 ACUTE COUGH: ICD-10-CM

## 2025-04-06 DIAGNOSIS — J18.9 PNEUMONIA OF LEFT LOWER LOBE DUE TO INFECTIOUS ORGANISM: Primary | ICD-10-CM

## 2025-04-06 PROCEDURE — G0463 HOSPITAL OUTPT CLINIC VISIT: HCPCS | Performed by: PHYSICIAN ASSISTANT

## 2025-04-06 PROCEDURE — 71046 X-RAY EXAM CHEST 2 VIEWS: CPT

## 2025-04-06 PROCEDURE — 99214 OFFICE O/P EST MOD 30 MIN: CPT | Performed by: PHYSICIAN ASSISTANT

## 2025-04-06 RX ORDER — ALBUTEROL SULFATE 90 UG/1
2 INHALANT RESPIRATORY (INHALATION) EVERY 4 HOURS PRN
Qty: 8.5 G | Refills: 0 | Status: SHIPPED | OUTPATIENT
Start: 2025-04-06

## 2025-04-06 RX ORDER — BENZONATATE 200 MG/1
200 CAPSULE ORAL 3 TIMES DAILY PRN
Qty: 20 CAPSULE | Refills: 0 | Status: SHIPPED | OUTPATIENT
Start: 2025-04-06

## 2025-04-06 RX ORDER — DOXYCYCLINE 100 MG/1
100 TABLET ORAL 2 TIMES DAILY
Qty: 20 TABLET | Refills: 0 | Status: SHIPPED | OUTPATIENT
Start: 2025-04-06 | End: 2025-04-16

## 2025-04-06 NOTE — PATIENT INSTRUCTIONS
Please take up to four puffs of your albuterol inhaler as needed for shortness of breath and/or wheezing. If your shortness of breath and/or wheezing worsen please return to our clinic or go to the ER.    Take antibiotics as prescribed.   Take entire course of antibiotics.     Eat yogurt with live and active cultures and/or take a probiotic at least 3 hours before or after antibiotic dose.     Tessalon for cough    Please follow up with family doctor in 3-4 weeks for recheck

## 2025-04-06 NOTE — PROGRESS NOTES
[facility]        NAME: Rodney Lee is a 79 y.o. male  : 1945    MRN: [id number]  DATE: 2025  TIME: 10:46 AM      Assessment and Plan     Pneumonia of left lower lobe due to infectious organism [J18.9]  1. Pneumonia of left lower lobe due to infectious organism  amoxicillin-clavulanate (AUGMENTIN) 875-125 mg per tablet    doxycycline (ADOXA) 100 MG tablet      2. Acute cough  XR chest pa and lateral    benzonatate (TESSALON) 200 MG capsule      3. Shortness of breath  albuterol (ProAir HFA) 90 mcg/act inhaler          CXR suspicious for LLL pneumonia, will begin abx treatment awaiting radiology read.     POC Testing Results        Note:       Patient Instructions     Patient Instructions   Please take up to four puffs of your albuterol inhaler as needed for shortness of breath and/or wheezing. If your shortness of breath and/or wheezing worsen please return to our clinic or go to the ER.    Take antibiotics as prescribed.   Take entire course of antibiotics.     Eat yogurt with live and active cultures and/or take a probiotic at least 3 hours before or after antibiotic dose.     Tessalon for cough    Please follow up with family doctor in 3-4 weeks for recheck     Follow up with primary care provider.   Go to ER if symptoms worsen.    Chief Complaint     Chief Complaint   Patient presents with    Cough     Pt c/o productive cough, lethargy and loss of appetite for past four days. Body aches and chills but reports no fever.          History of Present Illness     Pt reports productive cough with shortness of breath, fatigue and wheezing x 4 days. He denies fever, n/v/d or sick contacts. He denies history of smoking or lung disease        Review of Systems     Review of Systems   Constitutional:  Positive for chills and fatigue. Negative for fever.   HENT:  Negative for congestion, ear pain and voice change.    Eyes:  Negative for redness.   Respiratory:  Positive for cough, shortness of  breath and wheezing.    Cardiovascular:  Negative for chest pain.   Gastrointestinal:  Negative for abdominal pain, diarrhea, nausea and vomiting.   Musculoskeletal:  Positive for myalgias.   Skin:  Negative for color change and rash.   Neurological:  Negative for seizures and syncope.   All other systems reviewed and are negative.        Current Medications       Current Outpatient Medications:     albuterol (ProAir HFA) 90 mcg/act inhaler, Inhale 2 puffs every 4 (four) hours as needed for wheezing or shortness of breath, Disp: 8.5 g, Rfl: 0    amLODIPine (NORVASC) 5 mg tablet, TAKE ONE TABLET BY MOUTH EVERY DAY, Disp: 90 tablet, Rfl: 1    amoxicillin-clavulanate (AUGMENTIN) 875-125 mg per tablet, Take 1 tablet by mouth every 12 (twelve) hours for 5 days, Disp: 10 tablet, Rfl: 0    benzonatate (TESSALON) 200 MG capsule, Take 1 capsule (200 mg total) by mouth 3 (three) times a day as needed for cough, Disp: 20 capsule, Rfl: 0    doxycycline (ADOXA) 100 MG tablet, Take 1 tablet (100 mg total) by mouth 2 (two) times a day for 10 days, Disp: 20 tablet, Rfl: 0    metoprolol succinate (TOPROL-XL) 50 mg 24 hr tablet, TAKE ONE TABLET BY MOUTH EVERY DAY, Disp: 100 tablet, Rfl: 1    pravastatin (PRAVACHOL) 10 mg tablet, TAKE ONE TABLET BY MOUTH AT BEDTIME, Disp: 90 tablet, Rfl: 1    Current Allergies     Allergies as of 04/06/2025    (No Known Allergies)              The following portions of the patient's history were reviewed and updated as appropriate: allergies, current medications, past family history, past medical history, past social history, past surgical history, and problem list.     Past Medical History:   Diagnosis Date    Hypertension     Thyroid nodule        Past Surgical History:   Procedure Laterality Date    BACK SURGERY      KNEE ARTHROSCOPY      NM ARTHRS KNE SURG W/MENISCECTOMY MED/LAT W/SHVG Left 9/19/2017    Procedure: KNEE ARTHROSCOPY; PARTIAL MEDIAL MENISCECTOMY;  Surgeon: Kareem Retana MD;   "Location: [facility];  Service: Orthopedics    IA TENDON SHEATH INCISION Left 2020    Procedure: LONG TRIGGER FINGER RELEASE;  Surgeon: Les Granger MD;  Location: [facility];  Service: Orthopedics       Family History   Problem Relation Age of Onset    Cancer Mother                  Medications have been verified.        Objective     /80   Pulse 80   Temp 98.7 °F (37.1 °C)   Resp 18   Ht 6' 1\" (1.854 m)   Wt 104 kg (230 lb)   SpO2 97%   BMI 30.34 kg/m²   No LMP for male patient.         Physical Exam     Physical Exam  Vitals and nursing note reviewed.   Constitutional:       General: He is not in acute distress.     Appearance: Normal appearance. He is not ill-appearing, toxic-appearing or diaphoretic.   HENT:      Head: Normocephalic and atraumatic.      Right Ear: Tympanic membrane, ear canal and external ear normal. There is no impacted cerumen.      Left Ear: Tympanic membrane, ear canal and external ear normal. There is no impacted cerumen.      Nose: Nose normal.      Mouth/Throat:      Lips: Pink. No lesions.      Mouth: Mucous membranes are moist.      Tongue: No lesions. Tongue does not deviate from midline.      Palate: No mass and lesions.      Pharynx: Oropharynx is clear. Uvula midline. No pharyngeal swelling, oropharyngeal exudate, posterior oropharyngeal erythema or uvula swelling.      Tonsils: No tonsillar exudate or tonsillar abscesses.   Eyes:      General: No scleral icterus.        Right eye: No discharge.         Left eye: No discharge.      Extraocular Movements: Extraocular movements intact.      Conjunctiva/sclera: Conjunctivae normal.      Pupils: Pupils are equal, round, and reactive to light.   Cardiovascular:      Rate and Rhythm: Normal rate and regular rhythm.      Heart sounds: Normal heart sounds. No murmur heard.     No friction rub. No gallop.   Pulmonary:      Effort: Pulmonary effort is normal. No respiratory distress.      Breath sounds: No " Temp of 100.4, Pt receiving PRBC's for HgB 5.4 stridor. Examination of the left-lower field reveals rales. Rales present. No wheezing or rhonchi.   Musculoskeletal:      Cervical back: Normal range of motion and neck supple. No rigidity.   Lymphadenopathy:      Cervical: No cervical adenopathy.   Skin:     General: Skin is warm and dry.      Coloration: Skin is not jaundiced or pale.      Findings: No bruising, erythema, lesion or rash.   Neurological:      General: No focal deficit present.      Mental Status: He is alert and oriented to person, place, and time. Mental status is at baseline.   Psychiatric:         Mood and Affect: Mood normal.         Behavior: Behavior normal.         Thought Content: Thought content normal.         Judgment: Judgment normal.

## 2025-05-06 ENCOUNTER — OFFICE VISIT (OUTPATIENT)
Dept: INTERNAL MEDICINE CLINIC | Facility: CLINIC | Age: 80
End: 2025-05-06
Payer: MEDICARE

## 2025-05-06 VITALS
DIASTOLIC BLOOD PRESSURE: 70 MMHG | OXYGEN SATURATION: 98 % | WEIGHT: 231.4 LBS | HEART RATE: 59 BPM | SYSTOLIC BLOOD PRESSURE: 138 MMHG | TEMPERATURE: 98.1 F | BODY MASS INDEX: 30.53 KG/M2

## 2025-05-06 DIAGNOSIS — N18.31 STAGE 3A CHRONIC KIDNEY DISEASE (HCC): ICD-10-CM

## 2025-05-06 DIAGNOSIS — I71.40 ABDOMINAL AORTIC ANEURYSM (AAA) WITHOUT RUPTURE, UNSPECIFIED PART (HCC): Primary | ICD-10-CM

## 2025-05-06 DIAGNOSIS — R60.0 EDEMA OF EXTREMITIES: ICD-10-CM

## 2025-05-06 DIAGNOSIS — R05.1 ACUTE COUGH: ICD-10-CM

## 2025-05-06 DIAGNOSIS — E78.00 PURE HYPERCHOLESTEROLEMIA: ICD-10-CM

## 2025-05-06 DIAGNOSIS — I10 ESSENTIAL HYPERTENSION: ICD-10-CM

## 2025-05-06 PROCEDURE — 99214 OFFICE O/P EST MOD 30 MIN: CPT | Performed by: INTERNAL MEDICINE

## 2025-05-06 PROCEDURE — G2211 COMPLEX E/M VISIT ADD ON: HCPCS | Performed by: INTERNAL MEDICINE

## 2025-05-06 RX ORDER — LOSARTAN POTASSIUM 100 MG/1
100 TABLET ORAL DAILY
Qty: 100 TABLET | Refills: 3 | Status: SHIPPED | OUTPATIENT
Start: 2025-05-06

## 2025-05-06 NOTE — ASSESSMENT & PLAN NOTE
Continue blood pressure meds along with healthy diet and exercise  Orders:  •  losartan (COZAAR) 100 MG tablet; Take 1 tablet (100 mg total) by mouth daily

## 2025-05-06 NOTE — ASSESSMENT & PLAN NOTE
Lung exam okay, with patient having an abnormal chest x-ray about a month ago, I recommend rechecking, will consider CT if symptoms persist.  Orders:  •  XR chest pa and lateral; Future

## 2025-05-06 NOTE — ASSESSMENT & PLAN NOTE
Lab Results   Component Value Date    EGFR 53 11/06/2024    EGFR 55 10/30/2023    EGFR 53 10/05/2022    CREATININE 1.27 11/06/2024    CREATININE 1.25 10/30/2023    CREATININE 1.29 10/05/2022   Avoid excessive use of NSAIDs

## 2025-05-06 NOTE — PROGRESS NOTES
Name: Rodney Lee      : 1945      MRN: [id number]  Encounter Provider: Bo Leavitt MD  Encounter Date: 2025   Encounter department: [facility]  :  Assessment & Plan  Abdominal aortic aneurysm (AAA) without rupture, unspecified part (HCC)    Orders:  •  US abdominal aorta; Future    Stage 3a chronic kidney disease (HCC)  Lab Results   Component Value Date    EGFR 53 2024    EGFR 55 10/30/2023    EGFR 53 10/05/2022    CREATININE 1.27 2024    CREATININE 1.25 10/30/2023    CREATININE 1.29 10/05/2022   Avoid excessive use of NSAIDs       Acute cough  Lung exam okay, with patient having an abnormal chest x-ray about a month ago, I recommend rechecking, will consider CT if symptoms persist.  Orders:  •  XR chest pa and lateral; Future    Pure hypercholesterolemia  Continue pravastatin       Essential hypertension  Continue blood pressure meds along with healthy diet and exercise  Orders:  •  losartan (COZAAR) 100 MG tablet; Take 1 tablet (100 mg total) by mouth daily    Edema of extremities  Avoid excess salt intake, can elevate 3 times a day for 30 minutes, stay active with walking, can try compression stockings.  Discussed that amlodipine could contribute to this.  Will change amlodipine 5 mg daily to losartan 100 mg daily              History of Present Illness   Patient reports having cough for about a month.  Went to urgent care , and was given Augmentin, doxycycline, albuterol inhaler, and had a chest x-ray done which showed an opacity in the left lung base.  At the time, patient was feeling sick with flu-like symptoms.  Patient reports the cough has persisted, no discolored mucus, no shortness of breath, no wheezing, no itchy eyes, no runny nose.  No GERD.    Cough  Pertinent negatives include no chills, fever, postnasal drip, rhinorrhea, sore throat, shortness of breath or wheezing.     Review of Systems   Constitutional:  Positive for fatigue. Negative  for chills and fever.   HENT:  Negative for congestion, postnasal drip, rhinorrhea, sinus pressure and sore throat.    Eyes:  Negative for itching.   Respiratory:  Positive for cough. Negative for shortness of breath and wheezing.        Objective   /70   Pulse 59   Temp 98.1 °F (36.7 °C) (Tympanic)   Wt 105 kg (231 lb 6.4 oz)   SpO2 98%   BMI 30.53 kg/m²      Physical Exam  Constitutional:       General: He is not in acute distress.     Appearance: Normal appearance.   Cardiovascular:      Rate and Rhythm: Normal rate and regular rhythm.      Heart sounds: Normal heart sounds. No murmur heard.  Pulmonary:      Effort: Pulmonary effort is normal. No respiratory distress.      Breath sounds: Normal breath sounds. No stridor. No wheezing, rhonchi or rales.   Musculoskeletal:      Right lower leg: Edema present.      Left lower leg: Edema present.   Neurological:      Mental Status: He is alert.

## 2025-05-06 NOTE — PATIENT INSTRUCTIONS
Problem List Items Addressed This Visit          Cardiovascular and Mediastinum    Essential hypertension    Continue blood pressure meds along with healthy diet and exercise         Relevant Medications    losartan (COZAAR) 100 MG tablet    AAA (abdominal aortic aneurysm) ([facility]) - Primary    Relevant Orders    US abdominal aorta       Genitourinary    CKD (chronic kidney disease) stage 3, GFR 30-59 ml/min ([facility])    Lab Results   Component Value Date    EGFR 53 11/06/2024    EGFR 55 10/30/2023    EGFR 53 10/05/2022    CREATININE 1.27 11/06/2024    CREATININE 1.25 10/30/2023    CREATININE 1.29 10/05/2022   Avoid excessive use of NSAIDs            Other    Pure hypercholesterolemia    Continue pravastatin         Edema of extremities    Avoid excess salt intake, can elevate 3 times a day for 30 minutes, stay active with walking, can try compression stockings.  Discussed that amlodipine could contribute to this.  Will change amlodipine 5 mg daily to losartan 100 mg daily         Acute cough    Lung exam okay, with patient having an abnormal chest x-ray about a month ago, I recommend rechecking, will consider CT if symptoms persist.         Relevant Orders    XR chest pa and lateral

## 2025-05-06 NOTE — ASSESSMENT & PLAN NOTE
Avoid excess salt intake, can elevate 3 times a day for 30 minutes, stay active with walking, can try compression stockings.  Discussed that amlodipine could contribute to this.  Will change amlodipine 5 mg daily to losartan 100 mg daily

## 2025-05-07 ENCOUNTER — APPOINTMENT (OUTPATIENT)
Dept: RADIOLOGY | Age: 80
End: 2025-05-07
Payer: MEDICARE

## 2025-05-07 ENCOUNTER — RESULTS FOLLOW-UP (OUTPATIENT)
Dept: INTERNAL MEDICINE CLINIC | Facility: CLINIC | Age: 80
End: 2025-05-07

## 2025-05-07 ENCOUNTER — HOSPITAL ENCOUNTER (OUTPATIENT)
Dept: RADIOLOGY | Age: 80
Discharge: HOME | End: 2025-05-07
Attending: INTERNAL MEDICINE
Payer: MEDICARE

## 2025-05-07 DIAGNOSIS — R05.1 ACUTE COUGH: Primary | ICD-10-CM

## 2025-05-07 DIAGNOSIS — R05.1 ACUTE COUGH: ICD-10-CM

## 2025-05-07 DIAGNOSIS — R93.89 ABNORMAL CXR: ICD-10-CM

## 2025-05-07 DIAGNOSIS — I71.40 ABDOMINAL AORTIC ANEURYSM (AAA) WITHOUT RUPTURE, UNSPECIFIED PART (HCC): ICD-10-CM

## 2025-05-07 PROCEDURE — 71046 X-RAY EXAM CHEST 2 VIEWS: CPT

## 2025-05-07 PROCEDURE — 76775 US EXAM ABDO BACK WALL LIM: CPT

## 2025-05-09 ENCOUNTER — TELEPHONE (OUTPATIENT)
Dept: OTHER | Facility: OTHER | Age: 80
End: 2025-05-09

## 2025-05-09 DIAGNOSIS — R10.84 GENERALIZED ABDOMINAL PAIN: Primary | ICD-10-CM

## 2025-05-09 NOTE — TELEPHONE ENCOUNTER
Called and made pt aware pt had ultrasound completed already results pending. Sending back as an FYI

## 2025-05-09 NOTE — TELEPHONE ENCOUNTER
Rodney is calling stating he has a procedure today for CT chest.  He stated he needed a CT of abdomen.  He no longer has a cough, he feels fine.  He stated he is waiting for results of Abd Ultrasound but thought he was getting CT of abd today?    Please call patient back.  He is looking for advice from Dr. Leavitt.  Thank you

## 2025-05-09 NOTE — TELEPHONE ENCOUNTER
Order entered for CT of the abdomen pelvis, he will need to get lab work before getting this done to recheck kidney function.  The ultrasound of the abdomen does not require the blood work before doing, so could still get the ultrasound of the abdomen.  The CT of the chest was ordered for a separate reason due to the chest x-ray results, the CT of the chest was not ordered for the abdominal pain.  So, I recommend patient get the ultrasound of the abdomen, the CT of the chest which is not urgent, and get the blood work as soon as possible so that he can schedule the CT of the abdomen and pelvis

## 2025-05-10 ENCOUNTER — APPOINTMENT (OUTPATIENT)
Dept: LAB | Age: 80
End: 2025-05-10
Payer: MEDICARE

## 2025-05-10 DIAGNOSIS — R10.84 GENERALIZED ABDOMINAL PAIN: ICD-10-CM

## 2025-05-10 LAB
ALBUMIN SERPL BCG-MCNC: 4.4 G/DL (ref 3.5–5)
ALP SERPL-CCNC: 50 U/L (ref 34–104)
ALT SERPL W P-5'-P-CCNC: 24 U/L (ref 7–52)
AMYLASE SERPL-CCNC: 46 IU/L (ref 29–103)
ANION GAP SERPL CALCULATED.3IONS-SCNC: 10 MMOL/L (ref 4–13)
AST SERPL W P-5'-P-CCNC: 23 U/L (ref 13–39)
BASOPHILS # BLD AUTO: 0.07 THOUSANDS/ÂΜL (ref 0–0.1)
BASOPHILS NFR BLD AUTO: 1 % (ref 0–1)
BILIRUB SERPL-MCNC: 0.62 MG/DL (ref 0.2–1)
BUN SERPL-MCNC: 22 MG/DL (ref 5–25)
CALCIUM SERPL-MCNC: 9.5 MG/DL (ref 8.4–10.2)
CHLORIDE SERPL-SCNC: 102 MMOL/L (ref 96–108)
CO2 SERPL-SCNC: 27 MMOL/L (ref 21–32)
CREAT SERPL-MCNC: 1.16 MG/DL (ref 0.6–1.3)
EOSINOPHIL # BLD AUTO: 0.2 THOUSAND/ÂΜL (ref 0–0.61)
EOSINOPHIL NFR BLD AUTO: 3 % (ref 0–6)
ERYTHROCYTE [DISTWIDTH] IN BLOOD BY AUTOMATED COUNT: 13.8 % (ref 11.6–15.1)
GFR SERPL CREATININE-BSD FRML MDRD: 59 ML/MIN/1.73SQ M
GLUCOSE P FAST SERPL-MCNC: 97 MG/DL (ref 65–99)
HCT VFR BLD AUTO: 44.6 % (ref 36.5–49.3)
HGB BLD-MCNC: 14.7 G/DL (ref 12–17)
IMM GRANULOCYTES # BLD AUTO: 0.02 THOUSAND/UL (ref 0–0.2)
IMM GRANULOCYTES NFR BLD AUTO: 0 % (ref 0–2)
LIPASE SERPL-CCNC: 68 U/L (ref 11–82)
LYMPHOCYTES # BLD AUTO: 1.48 THOUSANDS/ÂΜL (ref 0.6–4.47)
LYMPHOCYTES NFR BLD AUTO: 22 % (ref 14–44)
MCH RBC QN AUTO: 31.3 PG (ref 26.8–34.3)
MCHC RBC AUTO-ENTMCNC: 33 G/DL (ref 31.4–37.4)
MCV RBC AUTO: 95 FL (ref 82–98)
MONOCYTES # BLD AUTO: 0.53 THOUSAND/ÂΜL (ref 0.17–1.22)
MONOCYTES NFR BLD AUTO: 8 % (ref 4–12)
NEUTROPHILS # BLD AUTO: 4.44 THOUSANDS/ÂΜL (ref 1.85–7.62)
NEUTS SEG NFR BLD AUTO: 66 % (ref 43–75)
NRBC BLD AUTO-RTO: 0 /100 WBCS
PLATELET # BLD AUTO: 349 THOUSANDS/UL (ref 149–390)
PMV BLD AUTO: 10.3 FL (ref 8.9–12.7)
POTASSIUM SERPL-SCNC: 4.3 MMOL/L (ref 3.5–5.3)
PROT SERPL-MCNC: 7.6 G/DL (ref 6.4–8.4)
RBC # BLD AUTO: 4.69 MILLION/UL (ref 3.88–5.62)
SODIUM SERPL-SCNC: 139 MMOL/L (ref 135–147)
WBC # BLD AUTO: 6.74 THOUSAND/UL (ref 4.31–10.16)

## 2025-05-10 PROCEDURE — 36415 COLL VENOUS BLD VENIPUNCTURE: CPT

## 2025-05-10 PROCEDURE — 83690 ASSAY OF LIPASE: CPT

## 2025-05-10 PROCEDURE — 85025 COMPLETE CBC W/AUTO DIFF WBC: CPT

## 2025-05-10 PROCEDURE — 80053 COMPREHEN METABOLIC PANEL: CPT

## 2025-05-10 PROCEDURE — 82150 ASSAY OF AMYLASE: CPT

## 2025-05-12 ENCOUNTER — RESULTS FOLLOW-UP (OUTPATIENT)
Dept: INTERNAL MEDICINE CLINIC | Facility: CLINIC | Age: 80
End: 2025-05-12

## 2025-05-12 NOTE — TELEPHONE ENCOUNTER
Patient called in and received message about lab work.    He had a US done on 5/7.  He would like to know the results, when they come in.  Still in process.

## 2025-05-14 ENCOUNTER — HOSPITAL ENCOUNTER (OUTPATIENT)
Dept: RADIOLOGY | Age: 80
Discharge: HOME | End: 2025-05-14

## 2025-05-14 DIAGNOSIS — R10.84 GENERALIZED ABDOMINAL PAIN: ICD-10-CM

## 2025-05-16 ENCOUNTER — HOSPITAL ENCOUNTER (OUTPATIENT)
Dept: RADIOLOGY | Age: 80
Discharge: HOME | End: 2025-05-16
Payer: MEDICARE

## 2025-05-16 DIAGNOSIS — R10.84 GENERALIZED ABDOMINAL PAIN: ICD-10-CM

## 2025-05-16 PROCEDURE — 74177 CT ABD & PELVIS W/CONTRAST: CPT

## 2025-05-16 RX ADMIN — IOHEXOL 100 ML: 350 INJECTION, SOLUTION INTRAVENOUS at 08:01

## 2025-05-22 ENCOUNTER — RESULTS FOLLOW-UP (OUTPATIENT)
Dept: INTERNAL MEDICINE CLINIC | Facility: CLINIC | Age: 80
End: 2025-05-22

## 2025-06-05 PROBLEM — R05.1 ACUTE COUGH: Status: RESOLVED | Noted: 2025-05-06 | Resolved: 2025-06-05

## 2025-06-16 DIAGNOSIS — E78.00 PURE HYPERCHOLESTEROLEMIA: ICD-10-CM

## 2025-06-16 RX ORDER — PRAVASTATIN SODIUM 10 MG
10 TABLET ORAL
Qty: 90 TABLET | Refills: 1 | Status: SHIPPED | OUTPATIENT
Start: 2025-06-16

## 2025-07-08 DIAGNOSIS — I10 ESSENTIAL HYPERTENSION: ICD-10-CM

## 2025-07-10 RX ORDER — METOPROLOL SUCCINATE 50 MG/1
50 TABLET, EXTENDED RELEASE ORAL DAILY
Qty: 100 TABLET | Refills: 1 | Status: SHIPPED | OUTPATIENT
Start: 2025-07-10

## (undated) DEVICE — SPONGE PVP SCRUB WING STERILE

## (undated) DEVICE — STERI STRIP 1/2 INCH

## (undated) DEVICE — STRETCH BANDAGE: Brand: CURITY

## (undated) DEVICE — GAUZE SPONGES,16 PLY: Brand: CURITY

## (undated) DEVICE — GLOVE SRG BIOGEL 7.5

## (undated) DEVICE — COBAN 2 IN UNSTERILE

## (undated) DEVICE — TIBURON EXTREMITY SHEET: Brand: CONVERTORS

## (undated) DEVICE — SUT MONOCRYL 4-0 PS-2 18 IN Y496G

## (undated) DEVICE — SUT NYLON 5-0 P-3 18 IN 690G

## (undated) DEVICE — STERILE BETHLEHEM PLASTIC HAND: Brand: CARDINAL HEALTH

## (undated) DEVICE — ACE WRAP 4 IN UNSTERILE

## (undated) DEVICE — INTENDED FOR TISSUE SEPARATION, AND OTHER PROCEDURES THAT REQUIRE A SHARP SURGICAL BLADE TO PUNCTURE OR CUT.: Brand: BARD-PARKER ® CARBON RIB-BACK BLADES

## (undated) DEVICE — GLOVE INDICATOR PI UNDERGLOVE SZ 6.5 BLUE

## (undated) DEVICE — DRESSING XEROFORM 5 X 9

## (undated) DEVICE — MINI BLADE ROUND TIP SHARP ON ONE SIDE

## (undated) DEVICE — ACE WRAP 6 IN UNSTERILE

## (undated) DEVICE — PACK UNIVERSAL ARTHRSCOPY PBDS

## (undated) DEVICE — TUBING ARTHROSCOPIC WAVE  MAIN PUMP

## (undated) DEVICE — CHLORAPREP HI-LITE 26ML ORANGE

## (undated) DEVICE — CUFF TOURNIQUET 18 X 4 IN QUICK CONNECT DISP 1 BLADDER

## (undated) DEVICE — LIGHT HANDLE COVER SLEEVE DISP BLUE STELLAR

## (undated) DEVICE — GLOVE INDICATOR PI UNDERGLOVE SZ 7 BLUE

## (undated) DEVICE — GLOVE INDICATOR PI UNDERGLOVE SZ 7.5 BLUE

## (undated) DEVICE — BLADE SHAVER TORPEDO CRV 4MM 13MM COOLCUT

## (undated) DEVICE — LIGHT GLOVE GREEN

## (undated) DEVICE — GLOVE SRG BIOGEL 7

## (undated) DEVICE — IMPERVIOUS STOCKINETTE: Brand: DEROYAL

## (undated) DEVICE — DRAPE SHEET THREE QUARTER

## (undated) DEVICE — ACE WRAP 3 IN STERILE

## (undated) DEVICE — ALL PURPOSE SPONGES,NON-WOVEN, 4 PLY: Brand: CURITY

## (undated) DEVICE — SYRINGE 50ML LL

## (undated) DEVICE — OCCLUSIVE GAUZE STRIP,3% BISMUTH TRIBROMOPHENATE IN PETROLATUM BLEND: Brand: XEROFORM

## (undated) DEVICE — INTENDED FOR TISSUE SEPARATION, AND OTHER PROCEDURES THAT REQUIRE A SHARP SURGICAL BLADE TO PUNCTURE OR CUT.: Brand: BARD-PARKER SAFETY BLADES SIZE 11, STERILE

## (undated) DEVICE — PADDING CAST 4 IN  COTTON STRL

## (undated) DEVICE — GLOVE SRG BIOGEL 6.5

## (undated) DEVICE — NEEDLE 25G X 1 1/2

## (undated) DEVICE — U-DRAPE: Brand: CONVERTORS

## (undated) DEVICE — DISPOSABLE EQUIPMENT COVER: Brand: SMALL TOWEL DRAPE

## (undated) DEVICE — 3M™ STERI-STRIP™ REINFORCED ADHESIVE SKIN CLOSURES, R1547, 1/2 IN X 4 IN (12 MM X 100 MM), 6 STRIPS/ENVELOPE: Brand: 3M™ STERI-STRIP™